# Patient Record
Sex: FEMALE | Race: AMERICAN INDIAN OR ALASKA NATIVE | URBAN - METROPOLITAN AREA
[De-identification: names, ages, dates, MRNs, and addresses within clinical notes are randomized per-mention and may not be internally consistent; named-entity substitution may affect disease eponyms.]

---

## 2023-03-28 LAB
ABO GROUP (TYPE) IN BLOOD: NORMAL
ANTIBODY SCREEN: NORMAL
CHLAMYDIA TRACH., AMPLIFIED: NEGATIVE
DEHYDROEPIANDROSTERONE SULFATE (DHEA-S) (UG/DL) IN SER/: 222 UG/DL (ref 65–395)
ESTIMATED AVERAGE GLUCOSE FOR HBA1C: 103 MG/DL
HEMOGLOBIN A1C/HEMOGLOBIN TOTAL IN BLOOD: 5.2 %
HEPATITIS B VIRUS SURFACE AG PRESENCE IN SERUM: NONREACTIVE
HEPATITIS C VIRUS AB PRESENCE IN SERUM: NONREACTIVE
HIV 1/ 2 AG/AB SCREEN: NONREACTIVE
N. GONORRHEA, AMPLIFIED: NEGATIVE
PROLACTIN (UG/L) IN SER/PLAS: 6.8 UG/L (ref 3–20)
RH FACTOR: NORMAL
RUBELLA VIRUS IGG AB: POSITIVE
SYPHILIS TOTAL AB: NONREACTIVE
THYROPEROXIDASE AB (IU/ML) IN SER/PLAS: <28 IU/ML
THYROTROPIN (MIU/L) IN SER/PLAS BY DETECTION LIMIT <= 0.05 MIU/L: 3.02 MIU/L (ref 0.44–3.98)
VARICELLA ZOSTER IGG: POSITIVE

## 2023-04-01 LAB — ANTI-MULLERIAN HORMONE (AMH): 4.6 NG/ML (ref 0.4–16.02)

## 2023-05-22 LAB — CHORIOGONADOTROPIN (MIU/ML) IN SER/PLAS: 245 MIU/ML

## 2023-05-30 LAB
CHORIOGONADOTROPIN (MIU/ML) IN SER/PLAS: 9243 MIU/ML
THYROTROPIN (MIU/L) IN SER/PLAS BY DETECTION LIMIT <= 0.05 MIU/L: 4.93 MIU/L (ref 0.44–3.98)
THYROXINE (T4) FREE (NG/DL) IN SER/PLAS: 0.75 NG/DL (ref 0.61–1.12)

## 2023-07-10 LAB
ABO GROUP (TYPE) IN BLOOD: NORMAL
ANTIBODY SCREEN: NORMAL
CHORIOGONADOTROPIN (MIU/ML) IN SER/PLAS: ABNORMAL MIU/ML
ERYTHROCYTE DISTRIBUTION WIDTH (RATIO) BY AUTOMATED COUNT: 13.6 % (ref 11.5–14.5)
ERYTHROCYTE MEAN CORPUSCULAR HEMOGLOBIN CONCENTRATION (G/DL) BY AUTOMATED: 34 G/DL (ref 32–36)
ERYTHROCYTE MEAN CORPUSCULAR VOLUME (FL) BY AUTOMATED COUNT: 77 FL (ref 80–100)
ERYTHROCYTES (10*6/UL) IN BLOOD BY AUTOMATED COUNT: 4.89 X10E12/L (ref 4–5.2)
HEMATOCRIT (%) IN BLOOD BY AUTOMATED COUNT: 37.7 % (ref 36–46)
HEMOGLOBIN (G/DL) IN BLOOD: 12.8 G/DL (ref 12–16)
LEUKOCYTES (10*3/UL) IN BLOOD BY AUTOMATED COUNT: 6.2 X10E9/L (ref 4.4–11.3)
PLATELETS (10*3/UL) IN BLOOD AUTOMATED COUNT: 252 X10E9/L (ref 150–450)
RH FACTOR: NORMAL

## 2023-07-19 LAB — CHORIOGONADOTROPIN (MIU/ML) IN SER/PLAS: 782 IU/L

## 2023-07-26 LAB — CHORIOGONADOTROPIN (MIU/ML) IN SER/PLAS: 203 IU/L

## 2023-08-02 LAB — CHORIOGONADOTROPIN (MIU/ML) IN SER/PLAS: 62 IU/L

## 2023-08-09 LAB — CHORIOGONADOTROPIN (MIU/ML) IN SER/PLAS: 22 IU/L

## 2023-08-11 LAB — THYROTROPIN (MIU/L) IN SER/PLAS BY DETECTION LIMIT <= 0.05 MIU/L: 2.53 MIU/L (ref 0.44–3.98)

## 2023-10-16 ENCOUNTER — TELEMEDICINE (OUTPATIENT)
Dept: ENDOCRINOLOGY | Facility: CLINIC | Age: 28
End: 2023-10-16
Payer: COMMERCIAL

## 2023-10-16 DIAGNOSIS — O03.9 MISCARRIAGE (HHS-HCC): ICD-10-CM

## 2023-10-16 DIAGNOSIS — N96 RECURRENT PREGNANCY LOSS WITHOUT CURRENT PREGNANCY: Primary | ICD-10-CM

## 2023-10-16 PROCEDURE — 99215 OFFICE O/P EST HI 40 MIN: CPT | Performed by: OBSTETRICS & GYNECOLOGY

## 2023-10-16 ASSESSMENT — PAIN SCALES - GENERAL: PAINLEVEL: 0-NO PAIN

## 2023-10-16 NOTE — Clinical Note
@Shani- she doesn't need anything for this month but wants to ensure her BP is complete for an IUI month next month. Lmk if questions! Maria MENDOSA

## 2023-10-16 NOTE — PROGRESS NOTES
History of Present Illness: INTERVAL HISTORY:    [place] for past few months. Was reviewing labs from August and was wanting to know where to  based on last labs. Not taking Metformin. Stopped taking Synthroid. Was taking Clomid 150mg this month, last pill was this . Today is cycle day 8. For the past two months, periods have been regular. Did not do a urine pregnancy test.   BMI = 28    History of Present IllnessINT Hx: Here for new plan of care after D&C for aneuploid pregnancy loss.  (SAB x 2)  Anora 46XX  Taking synthroid 50mcg for elevated TSH and has not had a repeat TSH done. Has taken letrozole 5mg and progesterone in the past.   She is positive for alpha thal but he is not.   + hx oligomenorrhea, ovaries appear somewhat PCOS on US - AMH = 4.6  SA was WNL  IUI the month of conception with clomid 150mg and OPK showed TMC of 3.9, she did use BID progesterone     Prior HPI:  History of Present Illness  Referred by: Dr. Watson     Accompanied today by:      DATE OF COVID VACCINE: Pfizer x2     History of present illness: Patient is a 27 year year old - passed on own in 2021- early SAB (potentially another false positive on a kit in [place]) female who presents with trying to conceive x 2 years.      PRIOR EVALUATION / TREATMENT: letrozole x 6 months ? (2.5mg and 5mg)     AMH:   TSH: 3.19 ([facility])  PRL:   Blood Type:  pap normal   DHEAS:  HSG  normal  Rubella immune  [facility]  Varicella immune  [facility]  FSH: 8.8 ([facility] 10/2022)  Testosterone 24 10/22 [facility]  Other: SA from - Vol 3.1, Conc: 44 mil, Mot 50%,  mil, TMS 68 mil     Pelvic ult at [facility] in 10/222 normal     RELATIONSHIP STATUS:      OB Hx: (year, GA, mode, complications, name)  1 early SAB she passed on her own in .     GYN HISTORY:  History of STI or PID: no  Last pap smear: - at [facility] will send us the records  History of abnormal paps: no  Mammogram: no  Coitus: x/fertile week: 5-6 x    Pelvic pain: no  Pain with intercourse, bowel movements or full bladder: no     MENSTRUAL HISTORY:   LMP: 3/21/23  Menarche: 10  Contraception: no  Cycle length: q 30-34 days  Bleeding length: 3-5 days  Heavy bleeding 1-3 heavier and then very light  Dysmenorrhea: cramping x 1 day  OPK is positive around cd 18-21     ENDOCRINE/INFERTILITY HISTORY:  Duration of infertility: 2 years  Coital Activity/week: 5-6 x  Nipple Discharge: no  Vision changes / headaches: is prone to headaches  Excess hair growth: no  Acne/oily skin: no  Recent weight change: no  Exercise: yes, 5 x a week  PMH: none     MEDICATION: PNV, vitamin D     PSH: skin tag removed of rectum     PSYCH HISTORY: No     SOCIAL HISTORY:  Occupation:   Smoking: no  Alcohol: no  Drug use: no        PARTNER HISTORY:  PARTNER HISTORY: Pooja Pelon Plummer  Age-  89  Occupation-   Prior fertility history: no  PMH: no  PSH: skin tag removal   Smoking: occasional  Alcohol Use: no  Drug Use: no  Medications: no  Injuries /STDs: no  SA: yes at [facility]     FAMILY HISTORY:  Cancer history (breast, ovarian, colon): No     GENETIC HISTORY:   Ethnic Background:   Patient:  Spanish  Partner:  Spanish  Genetic Disease in Family: no  Birth Defects in Family: no  Genetic screening performed previously: no     28 yo  (SAB x 2), recent loss confirmed euploid with Anora 46XX  ? mild male factor  ? poss mild PCOS  HCG nearly resolved after SAB     PLAN  Orders Placed This Encounter   Procedures    Thyroid Peroxidase (TPO) Antibody     Standing Status:   Future     Standing Expiration Date:   10/16/2024     Order Specific Question:   Release result to [employer]     Answer:   Immediate [1]    Cardiolipin Antibody     Standing Status:   Future     Standing Expiration Date:   10/16/2024     Order Specific Question:   Release result to [employer]     Answer:   Immediate [1]    Beta-2 Glycoprotein Antibodies     Standing Status:   Future      Standing Expiration Date:   10/16/2024     Order Specific Question:   Release result to MyChart     Answer:   Immediate [1]    Lupus Anticoag. With Interpretation[Baker]     Standing Status:   Future     Standing Expiration Date:   10/16/2024     Order Specific Question:   Release result to MyChart     Answer:   Immediate [1]    Prolactin     Standing Status:   Future     Standing Expiration Date:   10/16/2024     Order Specific Question:   Release result to MyChart     Answer:   Immediate [1]    hCG, quantitative, pregnancy     Standing Status:   Future     Standing Expiration Date:   10/16/2024     Order Specific Question:   Release result to MyChart     Answer:   Immediate [1]         - Needs HCG to confirm that she is completely down to zero- will add to RPL labs  - Restart synthroid 50mcg - TSH check in 6 weeks, add anti TPO AB  - Reviewed typical causes of RPL - hormonal, genetic, structural, and clotting. We will add RPL labs today. Reviewed poss mild PCOS given apperance of ovaries on US and oligomenorrhea and AMH = 4.6.   - Metformin sent in so that she can start in [place] if she wishes. We will start with 500mg ER dose for now but advised to increase to 1500mg daily.   - Discussed clomid 150mg CD 3-7 with cycle monitoring trigger and IUI and plan progesterone in LP + metformin with the start of her NEXT menses.   - Reviewed some lifestyle changes in light of poss PCOS - reduce carbs and increase daily  - To start coated aspirin 81mg daily for empiric RPL prevention  - Chart to RN for follow up    Maria García MD

## 2023-10-17 ENCOUNTER — TELEPHONE (OUTPATIENT)
Dept: ENDOCRINOLOGY | Facility: CLINIC | Age: 28
End: 2023-10-17
Payer: COMMERCIAL

## 2023-10-17 NOTE — TELEPHONE ENCOUNTER
Calling about patient lab services     Phone call to patient. Let her know that orders are in and she can go to have it drawn at any time. She states she will go to [place] [facility].   Sarah Lagunas RN   10/17/23   3:18 PM

## 2023-10-18 ENCOUNTER — LAB (OUTPATIENT)
Dept: LAB | Facility: LAB | Age: 28
End: 2023-10-18
Payer: COMMERCIAL

## 2023-10-18 DIAGNOSIS — O03.9 MISCARRIAGE (HHS-HCC): ICD-10-CM

## 2023-10-18 DIAGNOSIS — N96 RECURRENT PREGNANCY LOSS WITHOUT CURRENT PREGNANCY: ICD-10-CM

## 2023-10-18 LAB
B-HCG SERPL-ACNC: <3 MIU/ML
B2 GLYCOPROT1 IGA SER-ACNC: 0.7 U/ML
B2 GLYCOPROT1 IGG SER-ACNC: <1.4 U/ML
B2 GLYCOPROT1 IGM SER-ACNC: 1.6 U/ML
CARDIOLIPIN IGA SERPL-ACNC: 0.7 APL U/ML
CARDIOLIPIN IGG SER IA-ACNC: <1.6 GPL U/ML
CARDIOLIPIN IGM SER IA-ACNC: 1.3 MPL U/ML
PROLACTIN SERPL-MCNC: 5 UG/L (ref 3–20)
THYROPEROXIDASE AB SERPL-ACNC: <28 IU/ML

## 2023-10-18 PROCEDURE — 85613 RUSSELL VIPER VENOM DILUTED: CPT

## 2023-10-18 PROCEDURE — 84702 CHORIONIC GONADOTROPIN TEST: CPT

## 2023-10-18 PROCEDURE — 84146 ASSAY OF PROLACTIN: CPT

## 2023-10-18 PROCEDURE — 85730 THROMBOPLASTIN TIME PARTIAL: CPT

## 2023-10-18 PROCEDURE — 86147 CARDIOLIPIN ANTIBODY EA IG: CPT

## 2023-10-18 PROCEDURE — 86376 MICROSOMAL ANTIBODY EACH: CPT

## 2023-10-18 PROCEDURE — 36415 COLL VENOUS BLD VENIPUNCTURE: CPT

## 2023-10-18 PROCEDURE — 86146 BETA-2 GLYCOPROTEIN ANTIBODY: CPT

## 2023-10-19 LAB
DRVVT SCREEN TO CONFIRM RATIO: 1.31 RATIO
DRVVT/DRVVT CFM NRMLZD PPP-RTO: 1.1 RATIO
DRVVT/DRVVT CFM P DOAC NEUT NORM PPP-RTO: 1.2 RATIO
LA 2 SCREEN W REFLEX-IMP: NORMAL
NORMALIZED SCT PPP-RTO: 0.89 RATIO
SILICA CLOTTING TIME CONFIRMATION: 1.37 RATIO
SILICA CLOTTING TIME SCREEN: 1.22 RATIO

## 2023-11-06 DIAGNOSIS — N97.9 FEMALE INFERTILITY: Primary | ICD-10-CM

## 2023-11-06 NOTE — PROGRESS NOTES
Boarding Pass Oral/Injectible with TIC/IUI Checklist    Age: 28 y.o.    Provider: Rowan De La Fuente CNP, R Flyckt MD  Primary RN: Doc Rosa  Reasons for Treatment: Unexplained Infertility and Recurrent Pregnancy Loss  Last BMI  24 : 29.86 kg/m²       Past Medical History:   Diagnosis Date    History of miscarriage      Ectopic Risk: N    Date Done Consultation Results/Comments   10/16/23          4/18/24 Medication Protocol Stimulation protocol: Clomid 150mg CD3-7  Trigger plan: HCG  Adjuncts:  ASA 81 mg      clomid 150mg CD 3-7 with cycle monitoring trigger and IUI and plan progesterone in LP + metformin 500mg every day (ok to not increase d/t side effects) with the start of her NEXT menses.   - To start coated aspirin 81mg daily for empiric RPL prevention. Start prednisone 10mg with pos preg test per RF.     If they decide to do SO will do letrozole 7.5mg x 5 with starting menopur 75 Ius daily until time of trigger. 24 patient decided to do SO - see BP cleared note at bottom             N/a MFM Consult Okay to proceed? N/A   N/a Psych Consult Okay to proceed? N/A   N/a Genetics Consult Okay to proceed? N/A    Other    Date Done Female Labs Results/Comments   7/10/2023 T&S (Q 1 Year) ABO: O  Rh: POS  Antibody: NEG   2024 Hep B sAg Nonreactive   2024 Hep C AB Nonreactive   2024 HIV Nonreactive   2024 Syphilis Nonreactive   2024 GC/CT GC: Negative  CT: Negative   3/28/2023 Rubella (Q 5 Years) POSITIVE     3/28/2023 Varicella (Q 5 Years) POSITIVE     2023 TSH 2.53 (Ref range: 0.44 - 3.98 mIU/L)   3/8/23 HgbA1C 5.2   3/8/23 AMH 4.598   3/28/23 Carrier Screening Myriad 2bP:   POS ALONAMALASEMMIA     3/28/23 Uterine Cavity Eval HSG:   Normal     SIS:    Hyster:    21 Pap Smear (Q 5 Years) Neg ([facility] result)  HPV:    N/a Mammogram ( > 40) (Q 1 Year)       Date Done Male Labs (required if IUI)  MEGAN TURNER (MRN: [id number]) Results/Comments   2024 Hep B sAg  Nonreactive   4/18/2024 Hep C AB  Nonreactive   4/18/2024 HIV Nonreactive   4/18/2024 Syphilis Nonreactive   4/18/2024 GC/CT GC: Negative  CT: Negative   3/28/23 Carrier Screening POS KRABBE, CANAVAN        5/8/23 Semen Analysis  Volume(mL): 3.1  Count(million): 103.3  Motility(%): 36  Motile Count(million): 37.46   Date Done Miscellaneous Results/Comments   N/A BMI Checklist  BMI > 40 or < 18 Added to chart:      N/A >= 45 Checklist  Added to chart:      Boarding Pass reviewed with LUCERO Laugnas RN and is complete and patient cleared to proceed with fertility treatment: Clomid 150 mg days 3-7/IUI with monitoring + Ovidrel Trigger and LP Prometrium 200 mg BID PV to start 3 days after IUI x 3 cycles and if no success evaluation needed.     Boarding pass reviewed with: Shani AMBROSIO  Protocol: Letrozole 7.5mg x 5 days with HMG 75 units subcutaneous daily until time of trigger x 3 cycles.  Plan on baby aspirin 81mg po daily.  Confirm with Dr. García about prednisone 5-10mg with positive pregnancy test.  Sperm: sperm  Testing up to date. yes  Procedure order placed. yes    Boarding pass approved for 3 cycles/until 8/24.    Rowan De La Fuente 05/13/24 10:08 AM

## 2023-11-10 ENCOUNTER — TELEPHONE (OUTPATIENT)
Dept: ENDOCRINOLOGY | Facility: CLINIC | Age: 28
End: 2023-11-10

## 2023-11-10 DIAGNOSIS — N97.9 FEMALE INFERTILITY: ICD-10-CM

## 2023-11-10 DIAGNOSIS — Z31.89 ENCOUNTER FOR ARTIFICIAL INSEMINATION: ICD-10-CM

## 2023-11-10 RX ORDER — CLOMIPHENE CITRATE 50 MG/1
150 TABLET ORAL DAILY
Qty: 15 TABLET | Refills: 0 | Status: SHIPPED | OUTPATIENT
Start: 2023-11-10 | End: 2023-12-11 | Stop reason: SDUPTHER

## 2023-11-10 RX ORDER — PROGESTERONE 200 MG/1
200 CAPSULE ORAL DAILY
Qty: 60 CAPSULE | Refills: 2 | Status: SHIPPED | OUTPATIENT
Start: 2023-11-10 | End: 2024-02-08

## 2023-11-10 NOTE — TELEPHONE ENCOUNTER
Patient is on her 3rd day of cycle and has to start her Clomid  Please send an Rx   She also wants to know the dosage she has to take   Please call her once Rx sent

## 2023-11-13 ENCOUNTER — SPECIALTY PHARMACY (OUTPATIENT)
Dept: PHARMACY | Facility: CLINIC | Age: 28
End: 2023-11-13

## 2023-11-13 ENCOUNTER — PHARMACY VISIT (OUTPATIENT)
Dept: PHARMACY | Facility: CLINIC | Age: 28
End: 2023-11-13
Payer: COMMERCIAL

## 2023-11-13 PROCEDURE — RXMED WILLOW AMBULATORY MEDICATION CHARGE

## 2023-11-14 ENCOUNTER — TELEPHONE (OUTPATIENT)
Dept: ENDOCRINOLOGY | Facility: CLINIC | Age: 28
End: 2023-11-14
Payer: COMMERCIAL

## 2023-11-14 DIAGNOSIS — N97.9 FEMALE INFERTILITY: ICD-10-CM

## 2023-11-14 NOTE — TELEPHONE ENCOUNTER
Patient is calling to get scheduled for her monitoring on Friday 11/17/23 follicle scan e2 lh  She stated she spoke with Aisha JARRELL  Please confirm no orders

## 2023-11-14 NOTE — TELEPHONE ENCOUNTER
Returned call to patient. Pended orders, orders signed for monitoring visit on 11/17. Relayed to patient she is good to schedule appointment, transferred patient to  to be scheduled for monitoring visit.   ULISES SANABRIA RN 11/14/2023 14:17

## 2023-11-17 ENCOUNTER — SPECIALTY PHARMACY (OUTPATIENT)
Dept: PHARMACY | Facility: CLINIC | Age: 28
End: 2023-11-17

## 2023-11-17 ENCOUNTER — ANCILLARY PROCEDURE (OUTPATIENT)
Dept: ENDOCRINOLOGY | Facility: CLINIC | Age: 28
End: 2023-11-17
Payer: COMMERCIAL

## 2023-11-17 DIAGNOSIS — N97.9 FEMALE INFERTILITY: ICD-10-CM

## 2023-11-17 DIAGNOSIS — Z31.89 ENCOUNTER FOR ARTIFICIAL INSEMINATION: ICD-10-CM

## 2023-11-17 LAB
ESTRADIOL SERPL-MCNC: 253 PG/ML
LH SERPL-ACNC: 8 IU/L

## 2023-11-17 PROCEDURE — 83002 ASSAY OF GONADOTROPIN (LH): CPT

## 2023-11-17 PROCEDURE — 82670 ASSAY OF TOTAL ESTRADIOL: CPT

## 2023-11-17 PROCEDURE — 36415 COLL VENOUS BLD VENIPUNCTURE: CPT

## 2023-11-17 PROCEDURE — 76857 US EXAM PELVIC LIMITED: CPT

## 2023-11-17 PROCEDURE — 76857 US EXAM PELVIC LIMITED: CPT | Performed by: OBSTETRICS & GYNECOLOGY

## 2023-11-17 RX ORDER — METFORMIN HYDROCHLORIDE 750 MG/1
750 TABLET, EXTENDED RELEASE ORAL
Qty: 30 TABLET | Refills: 1 | Status: SHIPPED | OUTPATIENT
Start: 2023-11-17 | End: 2024-11-16

## 2023-11-17 NOTE — PROGRESS NOTES
CYCLING NOTE    Here for US and/or lab monitoring for IUI #2; relevant findings reviewed.  Protocol is Clomid 150 with monitoring, trigger and IUI and LP prometrium.    Patient stayed for nurse visit. Pain is 0/10.  Patient and  provided education on how to inject Ovidrel trigger shot and given written instructions as well as link to video.   Patient states she has restarted her Synthroid and has been taking Metformin 750mg at dinner each evening. States she is not having any difficult with that dose. Advised patient to increase to 1500mg each evening at dinner as Dr. García wanted her to get up to 1500mg if possible. She verbalizes understanding. Refill placed for her metformin to her local pharmacy.  Team will contact patient later today with results and plan.    Doc Rosa  11/17/2023  8:35 AM    HUDDLE PROVIDER NOTE  Ultrasound and/or labs reviewed at huddle.   Results for orders placed or performed in visit on 11/17/23 (from the past 96 hour(s))   Estradiol   Result Value Ref Range    Estradiol 253 pg/mL   Luteinizing Hormone (LH)   Result Value Ref Range    Luteinizing Hormone 8.0 IU/L       RTC in four days for Follicle scan and Estradiol, Progesterone, and LH.   Natalie Foster    My Chart message sent to patient with above noted plan.  DOC ROSA on 11/17/23 at 2:58 PM.

## 2023-11-20 ENCOUNTER — SPECIALTY PHARMACY (OUTPATIENT)
Dept: PHARMACY | Facility: CLINIC | Age: 28
End: 2023-11-20

## 2023-11-21 ENCOUNTER — APPOINTMENT (OUTPATIENT)
Dept: ENDOCRINOLOGY | Facility: CLINIC | Age: 28
End: 2023-11-21

## 2023-11-21 ENCOUNTER — SPECIALTY PHARMACY (OUTPATIENT)
Dept: PHARMACY | Facility: CLINIC | Age: 28
End: 2023-11-21

## 2023-11-21 NOTE — PROGRESS NOTES
Patient called in regarding Ovidrel- had a question on storage. Advised it is to be stored under refrigeration until time to trigger.     Patient voiced understanding.      Saira Shaw (Katie), PharmD  [facility]  Clinical Pharmacy Specialist- Fertility   [facility], Nandini Collado  [address] [place] [address]  Email: [email or web address]  Tel: [phone]       Fax: [phone]

## 2023-11-22 ENCOUNTER — ANCILLARY PROCEDURE (OUTPATIENT)
Dept: ENDOCRINOLOGY | Facility: CLINIC | Age: 28
End: 2023-11-22
Payer: COMMERCIAL

## 2023-11-22 DIAGNOSIS — Z31.89 ENCOUNTER FOR ARTIFICIAL INSEMINATION: ICD-10-CM

## 2023-11-22 DIAGNOSIS — N97.9 FEMALE INFERTILITY: ICD-10-CM

## 2023-11-22 LAB
ESTRADIOL SERPL-MCNC: 590 PG/ML
LH SERPL-ACNC: 9 IU/L
PROGEST SERPL-MCNC: 0.9 NG/ML

## 2023-11-22 PROCEDURE — 36415 COLL VENOUS BLD VENIPUNCTURE: CPT

## 2023-11-22 PROCEDURE — 82670 ASSAY OF TOTAL ESTRADIOL: CPT

## 2023-11-22 PROCEDURE — 83002 ASSAY OF GONADOTROPIN (LH): CPT

## 2023-11-22 PROCEDURE — 84144 ASSAY OF PROGESTERONE: CPT

## 2023-11-22 PROCEDURE — 76857 US EXAM PELVIC LIMITED: CPT

## 2023-11-22 NOTE — PROGRESS NOTES
CYCLING NOTE    Here for US and/or lab monitoring for IUI #2; relevant findings reviewed.  Reviewed proper administration technique of Ovidrel trigger with patient today.   Patient stayed for nurse visit. Pain is 0/10  Team will contact patient later today with results and plan.    Doc Rosa  11/22/2023  8:00 AM    HUDDLE PROVIDER NOTE  Ultrasound and/or labs reviewed at huddle.   Results for orders placed or performed in visit on 11/22/23 (from the past 96 hour(s))   Estradiol   Result Value Ref Range    Estradiol 590 pg/mL   Luteinizing Hormone (LH)   Result Value Ref Range    Luteinizing Hormone 9.0 IU/L   Progesterone   Result Value Ref Range    Progesterone 0.9 ng/mL     u 11/23 (Day 16)   choriogonadotropin josi injection: Inject 250 mcg 1 time if needed under the skin    RTC in three days for  IUI  and  no labs .   Natalie Foster    My Chart message sent to patient with above noted plan.  DOC ROSA on 11/22/23 at 2:37 PM.

## 2023-11-24 ENCOUNTER — APPOINTMENT (OUTPATIENT)
Dept: ENDOCRINOLOGY | Facility: CLINIC | Age: 28
End: 2023-11-24

## 2023-11-25 ENCOUNTER — PROCEDURE VISIT (OUTPATIENT)
Dept: ENDOCRINOLOGY | Facility: CLINIC | Age: 28
End: 2023-11-25
Payer: COMMERCIAL

## 2023-11-25 DIAGNOSIS — Z31.89 ENCOUNTER FOR ARTIFICIAL INSEMINATION: ICD-10-CM

## 2023-11-25 PROCEDURE — 58322 ARTIFICIAL INSEMINATION: CPT | Performed by: OBSTETRICS & GYNECOLOGY

## 2023-11-25 PROCEDURE — 89261 SPERM ISOLATION COMPLEX: CPT | Performed by: STUDENT IN AN ORGANIZED HEALTH CARE EDUCATION/TRAINING PROGRAM

## 2023-11-25 NOTE — PROGRESS NOTES
"Patient ID: Delia Hernández is a 28 y.o. female.    Intrauterine Insemination (IUI)    Date/Time: 11/25/2023 10:43 AM    Performed by: Natali Watson MD  Authorized by: MATA JaneCNP    Consent:     Consent obtained:  Verbal and written    Consent given by:  Patient    Procedure risks and benefits discussed: yes      Patient questions answered: yes      Patient agrees, verbalizes understanding, and wants to proceed: yes      Educational handouts given: yes      Instructions and paperwork completed: yes    Procedure:     Pelvic exam performed: yes      Speculum placed in vagina: yes      Tenaculum applied to cervix: no      Type of insemination: intrauterine insemination      Ultrasound guidance: No      Speculum type: Soha      Catheter type: curved      Curvature: mild      Difficulty: easy      Estimated Blood Loss:  None    Specimen Return: none    Post-procedure:     Patient tolerated procedure well: yes      Post-procedure plan: patient counseled on signs and symptoms for which to call and/or return to clinic    Comments:     Procedure comments:  IUI Procedure note:    The patient presented today for IUI.     Consent signed by patient, IUI sample identified by patient, and Final Verification performed with patient via electronic system \"\" prior to insemination.   All patient's questions were discussed and answered.          Chaperone offered to patient for intimate exam: Patient declined chaperone  Name of chaperone: N/A    Specimen Source: Partner  Specimen Condition: Fresh  TMS 6.62mil    Additional notes: uncomplicated IUI with curved IUI catheter    Patient was advised to call office if she develops fever, chills, pelvic pain, or heavy bleeding.    Progesterone and post-IUI teaching completed. Pt will do a home pregnancy test two weeks from IUI date. If home pregnancy test positive, [facility]. If home pregnancy test negative, patient was advised to call office " with start of menses; will proceed with another cycle if appropriate.  Patient verbalized understanding of plan.    Attending Attestation: I was physically present for key and critical portions performed by the fellow. I reviewed the fellow's documentation and discussed the patient with the fellow. I agree with the fellow's medical decision making as documented in the fellow's note.             James Wu MD  Reproductive Endocrinology and Infertility  [facility]

## 2023-12-11 ENCOUNTER — DOCUMENTATION (OUTPATIENT)
Dept: ENDOCRINOLOGY | Facility: CLINIC | Age: 28
End: 2023-12-11

## 2023-12-11 ENCOUNTER — TELEPHONE (OUTPATIENT)
Dept: ENDOCRINOLOGY | Facility: CLINIC | Age: 28
End: 2023-12-11
Payer: COMMERCIAL

## 2023-12-11 DIAGNOSIS — N97.9 FEMALE INFERTILITY: ICD-10-CM

## 2023-12-11 RX ORDER — CLOMIPHENE CITRATE 50 MG/1
150 TABLET ORAL DAILY
Qty: 15 TABLET | Refills: 0 | Status: SHIPPED | OUTPATIENT
Start: 2023-12-11 | End: 2024-01-09 | Stop reason: WASHOUT

## 2023-12-11 NOTE — TELEPHONE ENCOUNTER
Patient would like a call back - wanted to make sure her Clomid was ordered as she has not heard back from the pharmacy

## 2023-12-11 NOTE — TELEPHONE ENCOUNTER
Returned patient's call, advised her that the pharmacy will not likely call her and she will need to call them to see if the medication is ready to be picked up or go there directly. Did let patient know medication was signed off this morning.   Patient states she started her Clomid yesterday as she had some. Advised patient that she will take CD 3-7 since she already started the medication, not CD4-8 as sent in her My Chart message this morning. She verbalizes understanding. Will return to office on 12/21 for CD14 monitoring appointment.  GIO Rosa RN 12/11/23 @ 1943

## 2023-12-21 ENCOUNTER — PHARMACY VISIT (OUTPATIENT)
Dept: PHARMACY | Facility: CLINIC | Age: 28
End: 2023-12-21
Payer: COMMERCIAL

## 2023-12-21 ENCOUNTER — SPECIALTY PHARMACY (OUTPATIENT)
Dept: PHARMACY | Facility: CLINIC | Age: 28
End: 2023-12-21

## 2023-12-21 ENCOUNTER — ANCILLARY PROCEDURE (OUTPATIENT)
Dept: ENDOCRINOLOGY | Facility: CLINIC | Age: 28
End: 2023-12-21
Payer: COMMERCIAL

## 2023-12-21 DIAGNOSIS — Z31.89 ENCOUNTER FOR ARTIFICIAL INSEMINATION: ICD-10-CM

## 2023-12-21 DIAGNOSIS — N97.9 FEMALE INFERTILITY: ICD-10-CM

## 2023-12-21 LAB
ESTRADIOL SERPL-MCNC: 261 PG/ML
LH SERPL-ACNC: 6.4 IU/L
PROGEST SERPL-MCNC: 1 NG/ML

## 2023-12-21 PROCEDURE — 83002 ASSAY OF GONADOTROPIN (LH): CPT

## 2023-12-21 PROCEDURE — 82670 ASSAY OF TOTAL ESTRADIOL: CPT

## 2023-12-21 PROCEDURE — 84144 ASSAY OF PROGESTERONE: CPT

## 2023-12-21 PROCEDURE — 36415 COLL VENOUS BLD VENIPUNCTURE: CPT

## 2023-12-21 PROCEDURE — RXMED WILLOW AMBULATORY MEDICATION CHARGE

## 2023-12-21 PROCEDURE — 76857 US EXAM PELVIC LIMITED: CPT

## 2023-12-21 NOTE — PROGRESS NOTES
CYCLING NOTE    Here for US and/or lab monitoring; relevant findings reviewed. She understands she will need Ovidrel potentially today or tomorrow from [facility]    Patient stayed for nurse visit. Pain is 0/10  Team will contact patient later today with results and plan.    Tangela Dockery  12/21/2023  9:18 AM    HUDDLE PROVIDER NOTE  [facility]   Results for orders placed or performed in visit on 12/21/23 (from the past 96 hour(s))   Estradiol   Result Value Ref Range    Estradiol 261 pg/mL   Luteinizing Hormone (LH)   Result Value Ref Range    Luteinizing Hormone 6.4 IU/L   Progesterone   Result Value Ref Range    Progesterone 1.0 ng/mL     Thu 12/21 (Day 14)   choriogonadotropin josi injection: Inject 250 mcg 1 time if needed under the skin    RTC in two days for  IUI  and  NO LABS .   Maria García  12/21/2023  12:51 PM    \calledpt and left vm: call [facility] now to get ovidrel delivered today. Trigger tonight between 8pm-10pm and call office today or tomorrow to get scheduled for IUI on 12/23. Call with any questions or concerns. My Chart message sent to patient also.   TANGELA DOCKERY on 12/21/23 at 1:56 PM.     Pt returned my call so My Chart message never sent. We discussed plan. Contact info given for [facility]. She will call them now. T/F to front to make IUI appt for 12/23. Pt verbalized understanding and is agreeable.   TANGELA DOCKERY on 12/21/23 at 2:00 PM.

## 2023-12-23 ENCOUNTER — PROCEDURE VISIT (OUTPATIENT)
Dept: ENDOCRINOLOGY | Facility: CLINIC | Age: 28
End: 2023-12-23
Payer: COMMERCIAL

## 2023-12-23 DIAGNOSIS — Z31.89 ENCOUNTER FOR ARTIFICIAL INSEMINATION: ICD-10-CM

## 2023-12-23 PROCEDURE — 89261 SPERM ISOLATION COMPLEX: CPT | Performed by: OBSTETRICS & GYNECOLOGY

## 2023-12-23 PROCEDURE — 58322 ARTIFICIAL INSEMINATION: CPT | Mod: GC | Performed by: OBSTETRICS & GYNECOLOGY

## 2023-12-23 NOTE — PROGRESS NOTES
"Patient ID: Delia Hernández is a 28 y.o. female.    Intrauterine Insemination (IUI)    Date/Time: 12/23/2023 10:28 AM    Performed by: Sarah Quintero MD  Authorized by: SHEELA Jane    Consent:     Consent obtained:  Verbal and written    Consent given by:  Patient    Procedure risks and benefits discussed: yes      Patient questions answered: yes      Patient agrees, verbalizes understanding, and wants to proceed: yes      Educational handouts given: yes      Instructions and paperwork completed: yes    Procedure:     Pelvic exam performed: yes      Speculum placed in vagina: yes      Tenaculum applied to cervix: no      Type of insemination: intrauterine insemination      Ultrasound guidance: No      Speculum type: Soha      Catheter type: curved      Curvature: mild      Difficulty: easy      Estimated Blood Loss:  None    Specimen Return: none    Post-procedure:     Patient tolerated procedure well: yes      Post-procedure plan: patient counseled on signs and symptoms for which to call and/or return to clinic    Comments:     Procedure comments:  IUI Procedure note:    The patient presented today for IUI.     Consent signed by patient, IUI sample identified by patient, and Final Verification performed with patient via electronic system \"\" prior to insemination.   All patient's questions were discussed and answered.          Chaperone offered to patient for intimate exam: Patient declined chaperone  Name of chaperone: N/A    Specimen Source: Partner  Specimen Condition: Fresh  TMS 30.7    Additional notes:    Patient was advised to call office if she develops fever, chills, pelvic pain, or heavy bleeding.    Progesterone and post-IUI teaching completed. Will start Progesterone three days after IUI and continue twice daily. Pt will do a home pregnancy test two weeks from IUI date. If home pregnancy test positive, patient will continue Progesterone and call office to schedule BHCG. If home " pregnancy test negative, patient will discontinue Progesterone, and was advised to call office with start of menses; will proceed with another cycle if appropriate.  Patient verbalized understanding of plan.    Attending Attestation: I was physically present for key and critical portions performed by the fellow. I reviewed the fellow's documentation and discussed the patient with the fellow. I agree with the fellow's medical decision making as documented in the fellow's note.

## 2024-01-09 ENCOUNTER — TELEPHONE (OUTPATIENT)
Dept: ENDOCRINOLOGY | Facility: CLINIC | Age: 29
End: 2024-01-09
Payer: COMMERCIAL

## 2024-01-09 DIAGNOSIS — N97.9 FEMALE INFERTILITY: ICD-10-CM

## 2024-01-09 DIAGNOSIS — E03.8 SUBCLINICAL HYPOTHYROIDISM: Primary | ICD-10-CM

## 2024-01-09 DIAGNOSIS — Z31.89 ENCOUNTER FOR ARTIFICIAL INSEMINATION: ICD-10-CM

## 2024-01-09 RX ORDER — LEVOTHYROXINE SODIUM 25 UG/1
50 TABLET ORAL DAILY
Qty: 60 TABLET | Refills: 11 | Status: SHIPPED
Start: 2024-01-09 | End: 2024-04-18

## 2024-01-09 RX ORDER — METFORMIN HYDROCHLORIDE 500 MG/1
1000 TABLET, EXTENDED RELEASE ORAL
Qty: 60 TABLET | Refills: 0 | Status: SHIPPED | OUTPATIENT
Start: 2024-01-09 | End: 2024-02-08

## 2024-01-09 RX ORDER — CLOMIPHENE CITRATE 50 MG/1
TABLET ORAL
Qty: 10 TABLET | Refills: 0 | Status: SHIPPED
Start: 2024-01-09 | End: 2024-01-11 | Stop reason: WASHOUT

## 2024-01-09 NOTE — TELEPHONE ENCOUNTER
Returned patient's call.  Patient called with menses for IUI cycle  Cycle #:  4  Medication: Clomid 150mg  Ovulation: Trigger  Sperm Source:  partner fresh  Luteal Support: Prometrium  Additional Medications: Aspirin  Boarding Pass signed off: 11/10/23 for 3 cycles  IUI order pended: yes  Additional Information: placed order for repeat TSH level as well as refills for Synthroid 50mcg and Metformin XR 500mg per patient request.     Patient aware to call for monitoring appointment on CD 14 which is 1/22/24. Advised to take Clomid 150mg beginning on CD3-7. Verbalizes understanding.  MADELYN LORA on 1/9/24 at 10:46 AM.

## 2024-01-09 NOTE — TELEPHONE ENCOUNTER
Patient started cycle yesterday spotting was advised to call for next steps  She is on progesterone and not sure if that makes her cycle less full flow  She wants to talk to Shani   She also needs clomid refill

## 2024-01-10 ENCOUNTER — TELEPHONE (OUTPATIENT)
Dept: ENDOCRINOLOGY | Facility: CLINIC | Age: 29
End: 2024-01-10
Payer: COMMERCIAL

## 2024-01-10 NOTE — TELEPHONE ENCOUNTER
Patient is calling to talk to Shani  to change her Rx from Clomid to Letrozole this cycle  She has to start it tomorrow

## 2024-01-10 NOTE — TELEPHONE ENCOUNTER
Returned patient's call. Is requesting to take Letrozole this cycle, not Clomid, because she thinks that would be better. Explained to patient the plan per the NP is for 3 cycles of Clomid and that they review lots of things to determine the best treatment plan. Patient states she understands but requesting RN to find out if Dr. García would prefer the change.  Discussed with Dr. García who states she agrees with current treatment plan.  Called patient back to update her about Dr. García's agreement with the Clomid. Patient advised that Dr. García will review everything in detail at her upcoming FUV and discuss next steps and any changes to her treatment at that time. Patient verbalizes understanding.  MADELYN LORA on 1/10/24 at 11:56 AM.

## 2024-01-11 ENCOUNTER — TELEPHONE (OUTPATIENT)
Dept: ENDOCRINOLOGY | Facility: CLINIC | Age: 29
End: 2024-01-11
Payer: COMMERCIAL

## 2024-01-11 DIAGNOSIS — N97.9 FEMALE INFERTILITY: ICD-10-CM

## 2024-01-11 RX ORDER — CLOMIPHENE CITRATE 50 MG/1
150 TABLET ORAL DAILY
Qty: 15 TABLET | Refills: 0 | Status: SHIPPED | OUTPATIENT
Start: 2024-01-11

## 2024-01-11 NOTE — TELEPHONE ENCOUNTER
Reason for call: Medication request  Medication requested: Clomid  Notes: Patient said the incorrect amount of the medication was sent in to the pharmacy. She said it was sent to [facility] but needs to go to [facility] on [address] in [place].

## 2024-01-11 NOTE — TELEPHONE ENCOUNTER
Call returned. See documentation and orders from previous encounter.   ULISES SANABRIA, RN 01/11/2024 09:38

## 2024-01-11 NOTE — PROGRESS NOTES
Returned call to patient. Patient had wrong dose sent to wrong pharmacy for her clomid IUI cycle. Relayed to patient I pended correct dose to correct pharmacy for her to start for her IUI. No further questions at this time.   ULISES SANABRIA RN 01/11/2024 09:37

## 2024-01-15 ENCOUNTER — TELEPHONE (OUTPATIENT)
Dept: ENDOCRINOLOGY | Facility: CLINIC | Age: 29
End: 2024-01-15
Payer: COMMERCIAL

## 2024-01-15 NOTE — TELEPHONE ENCOUNTER
Returned patient's call, she is inquiring about when to schedule her monitoring appointment for this cycle's IUI. Advised patient to schedule on CD14 [facility] is 1/22/24. Patient will call office this week to schedule.  MADELYN LORA on 1/15/24 at 4:08 PM.

## 2024-01-22 ENCOUNTER — APPOINTMENT (OUTPATIENT)
Dept: ENDOCRINOLOGY | Facility: CLINIC | Age: 29
End: 2024-01-22
Payer: COMMERCIAL

## 2024-02-22 ENCOUNTER — TELEMEDICINE (OUTPATIENT)
Dept: ENDOCRINOLOGY | Facility: CLINIC | Age: 29
End: 2024-02-22
Payer: COMMERCIAL

## 2024-04-18 ENCOUNTER — OFFICE VISIT (OUTPATIENT)
Dept: ENDOCRINOLOGY | Facility: CLINIC | Age: 29
End: 2024-04-18
Payer: COMMERCIAL

## 2024-04-18 ENCOUNTER — LAB (OUTPATIENT)
Dept: LAB | Facility: LAB | Age: 29
End: 2024-04-18
Payer: COMMERCIAL

## 2024-04-18 VITALS
TEMPERATURE: 98.3 F | HEART RATE: 80 BPM | HEIGHT: 66 IN | BODY MASS INDEX: 29.73 KG/M2 | DIASTOLIC BLOOD PRESSURE: 72 MMHG | WEIGHT: 185 LBS | SYSTOLIC BLOOD PRESSURE: 104 MMHG

## 2024-04-18 DIAGNOSIS — N97.9 FEMALE FERTILITY PROBLEM: ICD-10-CM

## 2024-04-18 DIAGNOSIS — Z11.3 SCREENING FOR STDS (SEXUALLY TRANSMITTED DISEASES): ICD-10-CM

## 2024-04-18 DIAGNOSIS — E03.8 SUBCLINICAL HYPOTHYROIDISM: Primary | ICD-10-CM

## 2024-04-18 PROCEDURE — 99417 PROLNG OP E/M EACH 15 MIN: CPT | Performed by: OBSTETRICS & GYNECOLOGY

## 2024-04-18 PROCEDURE — 86803 HEPATITIS C AB TEST: CPT

## 2024-04-18 PROCEDURE — 87389 HIV-1 AG W/HIV-1&-2 AB AG IA: CPT

## 2024-04-18 PROCEDURE — 87340 HEPATITIS B SURFACE AG IA: CPT

## 2024-04-18 PROCEDURE — 86780 TREPONEMA PALLIDUM: CPT

## 2024-04-18 PROCEDURE — 36415 COLL VENOUS BLD VENIPUNCTURE: CPT

## 2024-04-18 PROCEDURE — 99215 OFFICE O/P EST HI 40 MIN: CPT | Performed by: OBSTETRICS & GYNECOLOGY

## 2024-04-18 PROCEDURE — 87800 DETECT AGNT MULT DNA DIREC: CPT

## 2024-04-18 PROCEDURE — 99215 OFFICE O/P EST HI 40 MIN: CPT | Mod: 25 | Performed by: OBSTETRICS & GYNECOLOGY

## 2024-04-18 RX ORDER — LEVOTHYROXINE SODIUM 50 UG/1
50 TABLET ORAL DAILY
Qty: 30 TABLET | Refills: 11 | Status: SHIPPED | OUTPATIENT
Start: 2024-04-18 | End: 2025-04-18

## 2024-04-18 ASSESSMENT — PAIN SCALES - GENERAL: PAINLEVEL: 0-NO PAIN

## 2024-04-18 NOTE — PROGRESS NOTES
Visit Type: In Person    Interval history: They went to [place] in Sept and came back in October and saw me in November. [place] the [place] 3 [place]. They did 4 IUIs without success. They have done 2 IUI in Nov and Dec since her SAB.   She is still taking synthroid but not metformin. She had difficulty with more than 1 pill per day. Order was placed for TSH but they had it done in [place] - he will send to me the report.   He went to an MD in [facility] - he had STD testing and a DNA fragmentation that was overall normal.   Poss mild PCOS given apperance of ovaries on US and oligomenorrhea and AMH = 4.6.   She is currently CD 9. LMP was 24.       Past History Reviewed and Affirmed Below:  History of Present Illness: INTERVAL HISTORY:    [place] for past few months. Was reviewing labs from August and was wanting to know where to  based on last labs. Not taking Metformin. Stopped taking Synthroid. Was taking Clomid 150mg this month, last pill was this . Today is cycle day 8. For the past two months, periods have been regular. Did not do a urine pregnancy test.   BMI = 28    History of Present IllnessINT Hx: Here for new plan of care after D&C for aneuploid pregnancy loss.  (SAB x 2)  Anora 46XX  Taking synthroid 50mcg for elevated TSH and has not had a repeat TSH done. Has taken letrozole 5mg and progesterone in the past.   She is positive for alpha thal but he is not.   + hx oligomenorrhea, ovaries appear somewhat PCOS on US - AMH = 4.6  SA was WNL  IUI the month of conception with clomid 150mg and OPK showed TMC of 3.9, she did use BID progesterone     Prior HPI:  History of Present Illness  Referred by: Dr. Watson     Accompanied today by:      DATE OF COVID VACCINE: Pfizer x2     History of present illness: Patient is a 27 year year old - passed on own in 2021- early SAB (potentially another false positive on a kit in [place]) female who presents with trying  to conceive x 2 years.      PRIOR EVALUATION / TREATMENT: letrozole x 6 months ? (2.5mg and 5mg)     AMH:   TSH: 3.19 ([facility])  PRL:   Blood Type:  pap normal   DHEAS:  HSG  normal  Rubella immune  [facility]  Varicella immune  [facility]  FSH: 8.8 ([facility] 10/2022)  Testosterone 24 10/22 [facility]  Other: SA from - Vol 3.1, Conc: 44 mil, Mot 50%,  mil, TMS 68 mil     Pelvic ult at [facility] in 10/222 normal     RELATIONSHIP STATUS:      OB Hx: (year, GA, mode, complications, name)  1 early SAB she passed on her own in .     GYN HISTORY:  History of STI or PID: no  Last pap smear: - at [facility] will send us the records  History of abnormal paps: no  Mammogram: no  Coitus: x/fertile week: 5-6 x   Pelvic pain: no  Pain with intercourse, bowel movements or full bladder: no     MENSTRUAL HISTORY:   LMP: 3/21/23  Menarche: 10  Contraception: no  Cycle length: q 30-34 days  Bleeding length: 3-5 days  Heavy bleeding 1-3 heavier and then very light  Dysmenorrhea: cramping x 1 day  OPK is positive around cd 18-21     ENDOCRINE/INFERTILITY HISTORY:  Duration of infertility: 2 years  Coital Activity/week: 5-6 x  Nipple Discharge: no  Vision changes / headaches: is prone to headaches  Excess hair growth: no  Acne/oily skin: no  Recent weight change: no  Exercise: yes, 5 x a week  PMH: none     MEDICATION: PNV, vitamin D     PSH: skin tag removed of rectum     PSYCH HISTORY: No     SOCIAL HISTORY:  Occupation:   Smoking: no  Alcohol: no  Drug use: no        PARTNER HISTORY:  PARTNER HISTORY: Pooja Newmanvijayemmanuelle  Age-  89  Occupation-   Prior fertility history: no  PMH: no  PSH: skin tag removal   Smoking: occasional  Alcohol Use: no  Drug Use: no  Medications: no  Injuries /STDs: no  SA: yes at [facility]     FAMILY HISTORY:  Cancer history (breast, ovarian, colon): No     GENETIC HISTORY:   Ethnic Background:   Patient:    Partner:    Genetic Disease in Family:  no  Birth Defects in Family: no  Genetic screening performed previously: no     NO CONCORDANCE  FOR MIKE Newman 2bP:   POS ALONAMALASEMMIA  For SARAH Myriad:  POS KRABBE, CANAVAN     27 yo  (SAB x 2), recent loss confirmed euploid with Anora 46XX  ? mild male factor  ? poss mild PCOS     PLAN  - Metformin to be maintained at  500mg ER dose - ok not to increase as she had bad side effects  - Discussed clomid 150mg CD 3-7 with cycle monitoring trigger and IUI and plan progesterone in LP + metformin with the start of her NEXT menses.   - To start coated aspirin 81mg daily for empiric RPL prevention. Consider prednisone 5-10mg with pos preg test.   - Fertility Reviewed letrozole 7.5mg with monitoring and trigger and poss IUI next day (last time her prog was increasing) with LP prog 100mg BID PV. They may also prefer to do OPKs. They are also interested in SO. Reviewed this in detail including risk of higher order multiples and poss fetal reduction. Will ask Rns to do BP for this. If not pregnant, may want to proceed with IVF. If they decide to do SO will do letrozole 7.5mg x 5 with starting menopur 75 Ius daily until time of trigger.   - They prefer to save on sperm costs and they would be ok to do IVF WITHOUT A FREEZE  - Call to schedule an IVF consult  - Chart to RN for follow up    Maria García 24 4:25 PM

## 2024-04-19 LAB
C TRACH RRNA SPEC QL NAA+PROBE: NEGATIVE
HBV SURFACE AG SERPL QL IA: NONREACTIVE
HCV AB SER QL: NONREACTIVE
HIV 1+2 AB+HIV1 P24 AG SERPL QL IA: NONREACTIVE
N GONORRHOEA DNA SPEC QL PROBE+SIG AMP: NEGATIVE
TREPONEMA PALLIDUM IGG+IGM AB [PRESENCE] IN SERUM OR PLASMA BY IMMUNOASSAY: NONREACTIVE

## 2024-05-13 DIAGNOSIS — N97.9 FEMALE INFERTILITY: ICD-10-CM

## 2024-05-13 PROCEDURE — RXMED WILLOW AMBULATORY MEDICATION CHARGE

## 2024-05-13 RX ORDER — CHORIONIC GONADOTROPIN 10000 UNIT
10000 KIT INTRAMUSCULAR ONCE AS NEEDED
Qty: 1 EACH | Refills: 0 | Status: SHIPPED | OUTPATIENT
Start: 2024-05-13

## 2024-05-13 RX ORDER — LETROZOLE 2.5 MG/1
7.5 TABLET, FILM COATED ORAL DAILY
Qty: 10 TABLET | Refills: 0 | Status: SHIPPED | OUTPATIENT
Start: 2024-05-13 | End: 2024-08-11

## 2024-05-13 NOTE — PROGRESS NOTES
Telephone call to patient today to discuss set up of SO IUI cycle after BP cleared by YESSI De La Fuente NP.  Reviewed with patient LMP 5/11/24 and patient to come tomorrow, 5/14/24 CD 4, for baseline follicle scan and E2. Plan to start letrozole 7.5mg tomorrow afternoon and Menopur 75 units on 5/17/24 in the evening, return for monitoring on 5/20/24. Patient and spouse have numerous questions about timing as they state Dr. García told them the letrozole would start on CD3. Explained to patient that letrozole can be started as late as CD5 and the baseline appointment is necessary because they are using injectable stimulation medication - both verbalize understanding.   Patient states she has her letrozole and menopur but no trigger shot. Rx entered for Pregnyl trigger to [facility]. Patient transferred to  to schedule her baseline for tomorrow.  Doc Rosa 05/13/24 11:19 AM

## 2024-05-14 ENCOUNTER — SPECIALTY PHARMACY (OUTPATIENT)
Dept: PHARMACY | Facility: CLINIC | Age: 29
End: 2024-05-14

## 2024-05-14 ENCOUNTER — ANCILLARY PROCEDURE (OUTPATIENT)
Dept: ENDOCRINOLOGY | Facility: CLINIC | Age: 29
End: 2024-05-14

## 2024-05-14 DIAGNOSIS — N97.9 FEMALE INFERTILITY: ICD-10-CM

## 2024-05-14 LAB — ESTRADIOL SERPL-MCNC: <20 PG/ML

## 2024-05-14 PROCEDURE — 36415 COLL VENOUS BLD VENIPUNCTURE: CPT

## 2024-05-14 PROCEDURE — 76857 US EXAM PELVIC LIMITED: CPT

## 2024-05-14 PROCEDURE — 76857 US EXAM PELVIC LIMITED: CPT | Performed by: OBSTETRICS & GYNECOLOGY

## 2024-05-14 RX ORDER — LETROZOLE 2.5 MG/1
7.5 TABLET, FILM COATED ORAL DAILY
Qty: 15 TABLET | Refills: 0 | OUTPATIENT
Start: 2024-05-14 | End: 2024-08-12

## 2024-05-20 ENCOUNTER — TELEPHONE (OUTPATIENT)
Dept: ENDOCRINOLOGY | Facility: CLINIC | Age: 29
End: 2024-05-20
Payer: COMMERCIAL

## 2024-05-20 ENCOUNTER — APPOINTMENT (OUTPATIENT)
Dept: ENDOCRINOLOGY | Facility: CLINIC | Age: 29
End: 2024-05-20
Payer: COMMERCIAL

## 2024-05-20 NOTE — PROGRESS NOTES
Patient states she was unable to come in for appointment today as she is OOT today and tomorrow. Patient states she moved her appointment until Wednesday 5/22/24. Patient instructed this RN will have to discuss medication plan with providers in today's meeting and call her with the plan. Patient agreeable and denies further questions or concerns.  CINTHYA HYLTON on 5/20/24 at 12:25 PM.     HUDDLE PROVIDER NOTE  Ultrasound and/or labs reviewed at [facility]   No results found for this or any previous visit (from the past 96 hour(s)).  Mon 5/20 (Day 10)   menotropins recon soln injection: Inject 75 Units once daily in the evening under the skin    Pt is out of town for a last minute work trip. Called her and stated we prefer to monitor patients who are undergoing injectable medications to determine follicle growth and risk for multiples. Provided option of canceling now versus continuing with appt on Wednesday (with the possibility of cancellation based on results of ultrasound). She is going to try to return to [facility] and reschedule her ultrasound to tomorrow AM. She understands she may be cancelled based on the results tomorrow.    RTC in one day for Follicle scan and Estradiol and progesterone and lutenizing hormone.   Sarah Quintero  05/20/2024  1:37 PM    Patient called and scheduled appointment for tomorrow 5/21/24.   CINTHYA HYLTON on 5/20/24 at 1:43 PM.

## 2024-05-20 NOTE — TELEPHONE ENCOUNTER
Returned patient call regarding SO cycle. Patient states she was unable to come in for appointment today as she is OOT today and tomorrow. Patient states she moved her appointment until Wednesday 5/22/24. Patient instructed this RN will have to discuss medication plan with providers in today's meeting and call her with the plan. Patient agreeable. Patient states she will use last HMG tomorrow night. Patient instructed she can call Wednesday after appointment for refill. Patient agreeable and denies further questions or concerns. Patient added to noon huddle for today to discuss with team.   CINTHYA HYLTON on 5/20/24 at 12:00 PM.

## 2024-05-20 NOTE — TELEPHONE ENCOUNTER
Reason for call: Patient is calling to talk to a nurse about her medications. Please call her   Notes:

## 2024-05-21 ENCOUNTER — ANCILLARY PROCEDURE (OUTPATIENT)
Dept: ENDOCRINOLOGY | Facility: CLINIC | Age: 29
End: 2024-05-21

## 2024-05-21 ENCOUNTER — PHARMACY VISIT (OUTPATIENT)
Dept: PHARMACY | Facility: CLINIC | Age: 29
End: 2024-05-21
Payer: COMMERCIAL

## 2024-05-21 ENCOUNTER — SPECIALTY PHARMACY (OUTPATIENT)
Dept: PHARMACY | Facility: CLINIC | Age: 29
End: 2024-05-21

## 2024-05-21 DIAGNOSIS — N97.9 FEMALE INFERTILITY: ICD-10-CM

## 2024-05-21 LAB — ESTRADIOL SERPL-MCNC: 74 PG/ML

## 2024-05-21 PROCEDURE — 36415 COLL VENOUS BLD VENIPUNCTURE: CPT

## 2024-05-21 PROCEDURE — 76857 US EXAM PELVIC LIMITED: CPT

## 2024-05-21 NOTE — PROGRESS NOTES
CYCLING NOTE    Here for US and/or lab monitoring; relevant findings reviewed. Patient is 28 years old. Patient of Dr. García. AMH- 4.598. Patient is here for IUI #5 1st SO cycle. Patient took Letrozole 7.5 mg and is currently taking HMG 75 units. Patient is day 5 of stimulation (was unable to RTC yesterday as patient was OOT). Patient instructed to call [facility] for delivery of more HMG and trigger shot.     Patient stayed for nurse visit. Pain is 0/10  Team will contact patient later today with results and plan.    Cinthya Shin  05/21/2024  9:55 AM    [facility]  Ultrasound and/or labs reviewed at [facility].   Results for orders placed or performed in visit on 05/21/24 (from the past 96 hour(s))   Estradiol   Result Value Ref Range    Estradiol 74 pg/mL     Tue 5/21 (Day 11)   menotropins recon soln injection: Inject 75 Units once daily in the evening under the skin    Wed 5/22 (Day 12)   menotropins recon soln injection: Inject 75 Units once daily in the evening under the skin    RTC in two days for Follicle scan and Estradiol, Progesterone.  D/w Dr. Kristin Ortiz  05/21/2024  1:03 PM    Spoke with patient regarding above plan as well as sent my chart message to patient with plan. Patient instructed to continue HMG 75 units and RTC on Thursday for repeat monitoring. Patient scheduled on her way out this AM. Patient agreeable. Patient denies further questions or concerns.   CINTHYA SHIN on 5/21/24 at 1:45 PM.

## 2024-05-22 ENCOUNTER — APPOINTMENT (OUTPATIENT)
Dept: ENDOCRINOLOGY | Facility: CLINIC | Age: 29
End: 2024-05-22
Payer: COMMERCIAL

## 2024-05-23 ENCOUNTER — ANCILLARY PROCEDURE (OUTPATIENT)
Dept: ENDOCRINOLOGY | Facility: CLINIC | Age: 29
End: 2024-05-23

## 2024-05-23 ENCOUNTER — PHARMACY VISIT (OUTPATIENT)
Dept: PHARMACY | Facility: CLINIC | Age: 29
End: 2024-05-23
Payer: COMMERCIAL

## 2024-05-23 DIAGNOSIS — N97.9 FEMALE INFERTILITY: ICD-10-CM

## 2024-05-23 LAB
ESTRADIOL SERPL-MCNC: 103 PG/ML
PROGEST SERPL-MCNC: 0.9 NG/ML

## 2024-05-23 PROCEDURE — 76857 US EXAM PELVIC LIMITED: CPT

## 2024-05-23 PROCEDURE — 36415 COLL VENOUS BLD VENIPUNCTURE: CPT

## 2024-05-23 PROCEDURE — RXMED WILLOW AMBULATORY MEDICATION CHARGE

## 2024-05-23 PROCEDURE — 76857 US EXAM PELVIC LIMITED: CPT | Performed by: OBSTETRICS & GYNECOLOGY

## 2024-05-23 NOTE — PROGRESS NOTES
CYCLING NOTE    Here for US and/or lab monitoring for IUI #5, 1st Super Ovulation IUI; relevant findings reviewed.  Today is day 7 of HMG 75 units after Letrozole 7.5mg  Patient stayed for nurse visit. Pain is 0/10.  Team will contact patient later today with results and plan.    Doc Rosa  05/23/2024  8:16 AM    [facility]  Ultrasound and/or labs reviewed at huddle. Patient ready for trigger.   Results for orders placed or performed in visit on 05/23/24 (from the past 96 hour(s))   Estradiol   Result Value Ref Range    Estradiol 103 pg/mL   Progesterone   Result Value Ref Range    Progesterone 0.9 ng/mL     Thu 5/23 (Day 13)   menotropins recon soln injection: Inject 75 Units once daily in the evening under the skin    Fri 5/24 (Day 14)   chorionic gonadotropin injection: Inject 10,000 Units 1 time if needed under the skin      HCG trigger tomorrow night between 8-10pm for intrauterine insemination on Sunday, 5/26/2024.    D/w Dr. Raquel Ortiz  05/23/2024  12:49 PM    Phone call to patient, reviewed plan as noted above, patient verbalizes understanding at this time and denies additional questions. Patient also sent [facility] message with above plan noted. Will call [facility] today to get a dose of Menopur and her Pregnyl trigger delivered.  Doc Rosa 05/23/24 1:44 PM

## 2024-05-26 ENCOUNTER — PROCEDURE VISIT (OUTPATIENT)
Dept: ENDOCRINOLOGY | Facility: CLINIC | Age: 29
End: 2024-05-26

## 2024-05-26 DIAGNOSIS — Z31.89 ENCOUNTER FOR ARTIFICIAL INSEMINATION: ICD-10-CM

## 2024-05-26 PROCEDURE — 89261 SPERM ISOLATION COMPLEX: CPT | Performed by: OBSTETRICS & GYNECOLOGY

## 2024-05-26 PROCEDURE — 58322 ARTIFICIAL INSEMINATION: CPT | Performed by: OBSTETRICS & GYNECOLOGY

## 2024-05-26 PROCEDURE — 58322 ARTIFICIAL INSEMINATION: CPT | Mod: GC | Performed by: OBSTETRICS & GYNECOLOGY

## 2024-05-26 NOTE — PROGRESS NOTES
"Patient ID: Delia Hernández is a 28 y.o. female.    Intrauterine Insemination (IUI)    Date/Time: 5/26/2024 10:10 AM    Performed by: Sarah Qunitero MD  Authorized by: MATA Olmos-CNP    Consent:     Consent obtained:  Verbal and written    Consent given by:  Patient    Procedure risks and benefits discussed: yes      Patient questions answered: yes      Patient agrees, verbalizes understanding, and wants to proceed: yes      Educational handouts given: yes      Instructions and paperwork completed: yes    Procedure:     Pelvic exam performed: yes      Speculum placed in vagina: yes      Tenaculum applied to cervix: no      Type of insemination: intrauterine insemination      Ultrasound guidance: No      Speculum type: Soha      Catheter type: curved      Curvature: mild      Difficulty: easy      Estimated Blood Loss:  None    Specimen Return: none    Post-procedure:     Patient tolerated procedure well: yes      Post-procedure plan: patient counseled on signs and symptoms for which to call and/or return to clinic    Comments:     Procedure comments:  IUI Procedure note:    The patient presented today for IUI.     Consent signed by patient, IUI sample identified by patient, and Final Verification performed with patient via electronic system \"\" prior to insemination.   All patient's questions were discussed and answered.          Chaperone offered to patient for intimate exam: Patient declined chaperone  Name of chaperone: N/A    Specimen Source: Partner  Specimen Condition: Fresh  TMS 23.68    Additional notes:    Patient was advised to call office if she develops fever, chills, pelvic pain, or heavy bleeding.    Progesterone and post-IUI teaching completed. Will start Progesterone three days after IUI and continue twice daily. Pt will do a home pregnancy test two weeks from IUI date. If home pregnancy test positive, patient will continue Progesterone and call office to schedule [facility]. If " home pregnancy test negative, patient will discontinue Progesterone, and was advised to call office with start of menses; will proceed with another cycle if appropriate.  Patient verbalized understanding of plan.    Sarah Quintero MD PGY 5  Reproductive Endocrinology and Infertility Fellow      Attending Attestation: I was physically present for key and critical portions performed by the fellow. I reviewed the fellow's documentation and discussed the patient with the fellow. I agree with the fellow's medical decision making as documented in the fellow's note.

## 2024-05-29 ENCOUNTER — TELEPHONE (OUTPATIENT)
Dept: ENDOCRINOLOGY | Facility: CLINIC | Age: 29
End: 2024-05-29
Payer: COMMERCIAL

## 2024-05-29 DIAGNOSIS — N97.9 FEMALE INFERTILITY: ICD-10-CM

## 2024-05-29 DIAGNOSIS — N96 RECURRENT PREGNANCY LOSS WITHOUT CURRENT PREGNANCY: ICD-10-CM

## 2024-05-29 RX ORDER — PROGESTERONE 200 MG/1
200 CAPSULE ORAL 2 TIMES DAILY
Qty: 60 CAPSULE | Refills: 1 | Status: SHIPPED | OUTPATIENT
Start: 2024-05-29 | End: 2025-05-29

## 2024-05-29 NOTE — TELEPHONE ENCOUNTER
Caller: Delia  Reason for call: Medication request  Medication requested: Progesterone to be sent to [facility]

## 2024-05-29 NOTE — TELEPHONE ENCOUNTER
Send MyChart letting pt know that prometrium was sent to provider to sign off, will start it today.    05/29/24 at 10:07 AM - Maria Parra RN

## 2024-06-10 NOTE — PROGRESS NOTES
Medications ordered for upcoming IUI cycle.    Patient is being seen by the  Fertility clinic for the following:     Treatment plan:       Returned patient's call.  Patient called with menses for IUI cycle  Cycle #:  4  Medication: Clomid 150mg  Ovulation: Trigger  Sperm Source:  partner fresh  Luteal Support: Prometrium  Additional Medications: Aspirin  Boarding Pass signed off: 11/10/23 for 3 cycles  IUI order pended: yes  Additional Information: placed order for repeat TSH level as well as refills for Synthroid 50mcg and Metformin XR 500mg per patient request.      Patient aware to call for monitoring appointment on CD 14 which is 1/22/24. Advised to take Clomid 150mg beginning on CD3-7. Verbalizes understanding.  MADELYN LORA on 1/9/24 at 10:46 AM.        The following medications were sent into  Specialty Pharmacy on 12/11/2023 for the above treatment:   Ovidrel 250mcg/0.5mL syringe     Saira Shaw, Uday Shaw PharmD (Katie)  [facility]  Clinical Pharmacy Specialist- Fertility   [facility], Nandini Collado  [address] [place] [address]  Email: [email or web address]  Tel: [phone]       Fax: [phone]

## 2024-06-15 ENCOUNTER — TELEMEDICINE (OUTPATIENT)
Dept: ENDOCRINOLOGY | Facility: CLINIC | Age: 29
End: 2024-06-15
Payer: COMMERCIAL

## 2024-06-15 DIAGNOSIS — Z31.41 FERTILITY TESTING: ICD-10-CM

## 2024-06-15 DIAGNOSIS — Z01.812 ENCOUNTER FOR PREPROCEDURAL LABORATORY EXAMINATION: ICD-10-CM

## 2024-06-15 DIAGNOSIS — N97.9 FEMALE INFERTILITY: Primary | ICD-10-CM

## 2024-06-15 RX ORDER — NORGESTIMATE AND ETHINYL ESTRADIOL 0.25-0.035
1 KIT ORAL DAILY
Qty: 28 TABLET | Refills: 2 | Status: SHIPPED | OUTPATIENT
Start: 2024-06-15 | End: 2025-06-15

## 2024-06-15 NOTE — PROGRESS NOTES
Virtual or Telephone Consent: An interactive audio and video telecommunication system which permits real time communications between the patient (at the originating site) and provider (at the distant site) was utilized to provide this telehealth service and verbal consent was requested and obtained from Delia Hernández on this date, 06/15/24 for a telehealth visit.    IVF Note     Patient is a 28 y.o.  (SAB x 3) female, here for IVF consult due to RPL and she is cycle day 5 today. She has not taken any medications yet. She has no more injection medicine yet. She is taking 2 metformin a day. She is also taking ASA.   Here today with: Partner      Interval history: They went to [place] in Sept and came back in October and saw me in November. [place] in the [place] 3 [place]. They did 4 IUIs without success. They have done 2 IUI in Nov and Dec since her SAB.   She is still taking synthroid but not metformin. She had difficulty with more than 1 pill per day. Order was placed for TSH but they had it done in [place] - he will send to me the report.   He went to an MD in [facility] - he had STD testing and a DNA fragmentation that was overall normal.   Poss mild PCOS given apperance of ovaries on US and oligomenorrhea and AMH = 4.6.   She is currently CD 9. LMP was 24.     Past History Reviewed and Affirmed Below:  History of Present Illness: INTERVAL HISTORY:     [place] for past few months. Was reviewing labs from August and was wanting to know where to  based on last labs. Not taking Metformin. Stopped taking Synthroid. Was taking Clomid 150mg this month, last pill was this . Today is cycle day 8. For the past two months, periods have been regular. Did not do a urine pregnancy test.   BMI = 28    History of Present IllnessINT Hx: Here for new plan of care after D&C for aneuploid pregnancy loss.  (SAB x 2)  Anora 46XX  Taking synthroid 50mcg for elevated TSH and has not had a repeat TSH  done. Has taken letrozole 5mg and progesterone in the past.   She is positive for alpha thal but he is not.   + hx oligomenorrhea, ovaries appear somewhat PCOS on US - AMH = 4.6  SA was WNL  IUI the month of conception with clomid 150mg and OPK showed TMC of 3.9, she did use BID progesterone    Past Infertility Treatments: See MACHO Cycle tab  Fertility Cycles       Cycle Name Treatment Start Date Type Outcome    IUI #5 - 1st S.O. 05/11/2024 IUI Active    IUI #4 01/09/2024 IUI No Pregnancy    IUI #3 12/08/2023 IUI No Pregnancy    IUI # 2 11/08/2023 IUI No Pregnancy              GYN HISTORY   HX of abnormal Mammo: No  Pap smear: No results found for requested labs within last 365 days.  HPV: No results found for requested labs within last 1825 days.     PMH  Past Medical History:   Diagnosis Date    History of miscarriage      History of any clotting: No  History of hospitalizations or surgeries: No  History of easy bleeding/bruising: No    PSH  Past Surgical History:   Procedure Laterality Date    DILATION AND CURETTAGE OF UTERUS       Social history  Social History     Tobacco Use    Smoking status: Never    Smokeless tobacco: Never   Substance Use Topics    Alcohol use: Not Currently    Drug use: Never     Current smoker: No    Family history  Cognitive deficits: No  Birth defects: No  Other:     Current Meds  Current Outpatient Medications   Medication Sig Dispense Refill    chorionic gonadotropin (Pregnyl) 10,000 unit injection Reconstitute according to instructions and inject 10,000 units (1 mL) under the skin as a one time dose, as directed per provider for trigger. 1 each 0    clomiPHENE (Clomid) 50 mg tablet Take 3 tablets (150 mg) by mouth once daily. Take 3 tablets by mouth cycle days 3-7 as directed 15 tablet 0    letrozole (Femara) 2.5 mg tablet Take 3 tablets (7.5 mg total) by mouth once daily.  Cycle days 3-7; After negative urine pregnancy test every cycle before starting letrozole. 10 tablet 0     levothyroxine (Synthroid, Levoxyl) 50 mcg tablet Take 1 tablet (50 mcg) by mouth once daily. 30 tablet 11    menotropins (Menopur) 75 unit recon soln injection Reconstitute and inject 75 Units under the skin once daily in the evening. Inject under the skin as directed by provider 10 each 2    metFORMIN XR (Glucophage-XR) 750 mg 24 hr tablet Take 1 tablet (750 mg) by mouth once daily in the evening. Take with meals. Do not crush, chew, or split.  Increase to 1500mg daily if able to tolerate higher dose. 30 tablet 1    metFORMIN  mg 24 hr tablet Take 2 tablets (1,000 mg) by mouth once daily in the evening. Take with meals. Do not crush, chew, or split. 60 tablet 0    norgestimate-ethinyl estradioL (Ortho-Cyclen) 0.25-35 mg-mcg tablet Take 1 tablet by mouth once daily. Take active pills only as directed per provider 28 tablet 2    progesterone (Prometrium) 200 mg capsule Insert 1 capsule (200 mg) into the vagina 2 times a day. Starting 3 days after IUI 60 capsule 1     No current facility-administered medications for this visit.       Allergies  Patient has no known allergies.    Partner History  Name: MEGAN TURNER (MRN: [id number])  SA in past year:     VITALS:  There were no vitals taken for this visit.  BMI:   BMI Readings from Last 1 Encounters:   24 29.86 kg/m²       ASSESSMENT   28 y.o.  female with RPL and secondary infertility x 2 years, poss mild PCOS  Indication for IVF: Recurrent Pregnancy Loss and Polycystic Ovarian Syndrome  Partner SA: Normal    We reviewed IVF and discussed the following:   In-vitro fertilization and embryo transfer  Stimulation protocols   Oocyte retrieval, risks    Cryopreservation   Assessment of fertilization   Embryo development  Statistics  ICSI/Assisted hatching   Embryo transfer and preparation    Risks of OHSS and multiple gestation   Cancelled cycles   Use of birth control   Selective reduction   Number of embryos to transfer   Ectopic pregnancy  and  miscarriage  Team based care  Informed consent procedures  Folic acid supplementation   Genetic carrier screening   PGT  Frozen tissue storage and transport process  Discussed that PAP and mammogram must be updated if appropriate based on age and clinical  history and results received before treatment can begin.    ART Cycle Plan    1. Protocol/Fertility Plan Update:   Lead in: OCP  Stimulation protocol: Antagonist with  and menopur 75  Trigger plan: HCG vs Lupron  Pre-retrieval meds: Antibiotics per protocol  Adjuncts:  NONE  Notes:     2. FET:  Protocol: Programmed  Adjuncts:  ASA 81 mg, Doxy, and Prednisone  OCP candidate: Yes  Notes: CONTINUE METFORMIN    3. Insemination:  Sperm source: partner  Sperm collection method: Fresh no Backup   Notes:  ICSI: Yes  # of oocytes to be fertilized: all    4. Transfer:   Number of embryos to replace: 1 PGTA WNL  Stage of embryo transfer: day 5  Trial transfer needed? No    5. Cryopreservation plan  PGT: PGTA   Freeze all? Yes  Oocyte cryopreservation: No    6. Patient willing to accept blood transfusion: Yes    7. RN to review chart, initiate IVF boarding pass, and assure completion of the following prior to proceeding with IVF stimulation:       Orders Placed This Encounter   Procedures    Hysteroscopy diagnostic    Anti-Mullerian Hormone (AMH)    POCT pregnancy, urine manually resulted       STDs (Hepatitis B, Hepatitis C, HIV, Syphilis, GC/CT) for patient and partner (if applicable) to be completed within the last year (z11.3)  Genetic carrier testing: waiver or carrier screen completed with clearance documentation by provider for both patient and partner (z13.71)  Rubella and varicella to be completed within the last five years (z11.59)   TSH to be completed within the last year (z13.29)  Type & Screen to be completed within the last year (z01.83)  AMH to be completed within the last year (z31.41)  Pre-IVF Imaging: Reference any orders placed by provider.  Cavity  evaluation: hysteroscopy  Frozen sperm sample: ensure frozen partner sample (z31.41) or verify donor sperm on site prior to stimulation start date.  Verify in EMR or obtain copy of patient’s last mammogram (if applicable) and pap smear results for provider review in boarding pass.  Enroll in Engaged MD and complete annual consent forms for IVF and cryotransport agreements.  BMI checklist for BMI <18 or >40  Consults: Nursing and Financial Consult.  PAT Consult: No  Ectopic Risk: No  Medically Complex: No  Additional consults  NONE  and review what is in the boarding pass.    Ok to avoid sperm freeze- fresh only   Would really like a call from the financial counselor- self pay  Chart to Shani, may start OCP tonight (CD 4/5) in preparation for IVF asap  NEED TO CHECK ON MYRIAD RESULTS FOR BOTH    Maria García  06/15/2024  10:14 AM

## 2024-06-18 NOTE — PROGRESS NOTES
Boarding Pass IVF    Age: 28 y.o.    Provider: Maria García MD  Primary RN: Doc Rosa  Reasons for Treatment: Unexplained Infertility, Recurrent Pregnancy Loss, and Polycystic Ovarian Syndrome  Last BMI  24 : 28.57 kg/m²       Past Medical History:   Diagnosis Date    History of miscarriage        Date Done Consultation Results/Comments   6/15/24 Medication Protocol Lead in: OCP  Fertility Plan Update:   Stimulation protocol: Antagonist with  and menopur 75  Trigger plan: HCG vs Lupron  Pre-retrieval meds: Antibiotics per protocol  Adjuncts:  NONE  Notes: -   6/15/24 IVF Consult  [x]    PGT-A/M? Yes; Req Sent: Yes; PGT-M Test Ready: No n/a; Company: [employer]   7/15/24 IVF Information and Authorization (to be completed annually) Received and in chart: Yes (Doc Rosa RN)   7/15/24 UH Waiver (Out) Form Received and in chart: Yes (Doc Rosa RN)   7/15/24 [employer] Packet [x]    24 Procedure Order Placed [x]        MFM Consult Okay to proceed? N/A    Psych Consult Okay to proceed? N/A    Genetics Consult Okay to proceed? N/A    Other    Date Done Female Labs Results/Comments   No results found for requested labs within last 365 days.    REPEAT AT BASELINE T&S (Q 1 Year) ABO: No results found for requested labs within last 365 days.  Rh: No results found for requested labs within last 365 days.  Antibody: No results found for requested labs within last 365 days.     2024 Hep B sAg Nonreactive   2024 Hep C AB Nonreactive   2024 HIV Nonreactive   2024 Syphilis Nonreactive   2024 GC/CT GC: Negative  CT: Negative   3/28/2023 Rubella (Q 5 Years) POSITIVE   3/28/2023 Varicella (Q 5 Years) POSITIVE   2023 TSH 2.53 (Ref range: 0.44 - 3.98 mIU/L)   3/8/23 HgbA1C 5.2   2024 AMH 3.761   23 Carrier Screening Myriad 2bP:   Authorization completed 7/15/24  Pos CARRIER  alpha thalassemia, HBA1/HBA2 - related    Uterine Cavity Eval Pelvic US:  3/28/23:  Impression   Unremarkable pelvic survey.     HSG: 3/28/23:  Uterus - normal contour without filling defects   Tubes- bilateral patency with normal architecture    SIS:     Hyster: (6/21/2024) Procedure: Diagnostic Hysteroscopy   Preop diagnosis: fertility testing  Post op diagnosis: Same   Assistant: Dr. Doyle    Anesthesia: None   IV: None   EBL: 3 cc  Specimens: None   Complications: None   Risks benefits and alternatives of the procedure explained to the patient and informed consent was obtained. Urine pregnancy test was performed and was negative. Time out was performed. The patient was placed in the dorsal lithotomy position and a sterile speculum was placed in the vagina. The cervix was sterilized with Betadine x3. Paracervical block with lidocaine 1% was administered.   Tenaculum: Yes  Dilation: No  The hysteroscope was placed in the cervix and advanced into the uterine cavity. Normal saline was used for distension media. Images were obtained and findings noted as below.   All instruments were then removed. Good hemostasis was noted. Patient tolerated the procedure well returned to the recovery area in stable condition.   Findings:   Cavity: Smooth cavity no lesions noted  Ostia: Bilateral tubal ostia visualized  Additional Notes:    Attending Attestation: I was physically present for key and critical portions performed by the fellow. I reviewed the fellow's documentation and discussed the patient with the fellow. I agree with the fellow's medical decision making as documented in the fellow's note.   James Wu 06/21/24 11:19 AM                  Trial Transfer Needs at retrieval? 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,  Krabbe disease    5/26/2024 Semen Analysis  Volume(mL): 2.5  Concentration(million/mL): 52.63  Motility(%): 74  Motile Count(million): 23.68   6/15/24 OK to not do freeze per RF Sperm Freeze  # of vials:   TMS post thaw:    Date Done Miscellaneous Results/Comments   N/A BMI Checklist  BMI > 40 or < 18    N/A >= 45 Checklist     **Does not need to be completed prior to placing on IVF calendar**    MD Completion:  PAT needed: No  Ectopic Risk: No  Medically Complex: No    Needs T&S repeated at baseline  Otherwise okay to proceed  Natalie Foster 07/25/24 3:41 PM    FET after IVF Boarding pass deleted from this BP.   Doc Rosa 09/10/24 3:25 PM

## 2024-06-21 ENCOUNTER — ANCILLARY PROCEDURE (OUTPATIENT)
Dept: ENDOCRINOLOGY | Facility: CLINIC | Age: 29
End: 2024-06-21

## 2024-06-21 ENCOUNTER — PREP FOR PROCEDURE (OUTPATIENT)
Dept: ENDOCRINOLOGY | Facility: CLINIC | Age: 29
End: 2024-06-21
Payer: COMMERCIAL

## 2024-06-21 ENCOUNTER — HOSPITAL ENCOUNTER (OUTPATIENT)
Dept: ENDOCRINOLOGY | Facility: CLINIC | Age: 29
Discharge: HOME | End: 2024-06-21

## 2024-06-21 VITALS
SYSTOLIC BLOOD PRESSURE: 113 MMHG | DIASTOLIC BLOOD PRESSURE: 78 MMHG | HEART RATE: 66 BPM | RESPIRATION RATE: 16 BRPM | BODY MASS INDEX: 28.45 KG/M2 | WEIGHT: 177 LBS | OXYGEN SATURATION: 98 % | TEMPERATURE: 97.5 F | HEIGHT: 66 IN

## 2024-06-21 DIAGNOSIS — Z31.41 FERTILITY TESTING: ICD-10-CM

## 2024-06-21 DIAGNOSIS — Z01.812 ENCOUNTER FOR PREPROCEDURAL LABORATORY EXAMINATION: ICD-10-CM

## 2024-06-21 LAB — PREGNANCY TEST URINE, POC: NEGATIVE

## 2024-06-21 PROCEDURE — 83516 IMMUNOASSAY NONANTIBODY: CPT

## 2024-06-21 PROCEDURE — 7100000009 HC PHASE TWO TIME - INITIAL BASE CHARGE

## 2024-06-21 PROCEDURE — 7100000010 HC PHASE TWO TIME - EACH INCREMENTAL 1 MINUTE

## 2024-06-21 PROCEDURE — 36415 COLL VENOUS BLD VENIPUNCTURE: CPT

## 2024-06-21 PROCEDURE — 81025 URINE PREGNANCY TEST: CPT | Performed by: OBSTETRICS & GYNECOLOGY

## 2024-06-21 PROCEDURE — 58555 HYSTEROSCOPY DX SEP PROC: CPT | Performed by: OBSTETRICS & GYNECOLOGY

## 2024-06-21 PROCEDURE — 64450 NJX AA&/STRD OTHER PN/BRANCH: CPT | Performed by: OBSTETRICS & GYNECOLOGY

## 2024-06-21 RX ORDER — KETOROLAC TROMETHAMINE 30 MG/ML
30 INJECTION, SOLUTION INTRAMUSCULAR; INTRAVENOUS ONCE AS NEEDED
OUTPATIENT
Start: 2024-06-21 | End: 2024-06-26

## 2024-06-21 RX ORDER — ACETAMINOPHEN 325 MG/1
650 TABLET ORAL ONCE AS NEEDED
OUTPATIENT
Start: 2024-06-21

## 2024-06-21 RX ORDER — LIDOCAINE HYDROCHLORIDE 10 MG/ML
10 INJECTION, SOLUTION EPIDURAL; INFILTRATION; INTRACAUDAL; PERINEURAL ONCE
OUTPATIENT
Start: 2024-06-21

## 2024-06-21 ASSESSMENT — PAIN SCALES - GENERAL
PAINLEVEL_OUTOF10: 1
PAINLEVEL_OUTOF10: 0 - NO PAIN

## 2024-06-21 ASSESSMENT — PAIN DESCRIPTION - DESCRIPTORS: DESCRIPTORS: CRAMPING

## 2024-06-21 ASSESSMENT — PAIN - FUNCTIONAL ASSESSMENT
PAIN_FUNCTIONAL_ASSESSMENT: 0-10
PAIN_FUNCTIONAL_ASSESSMENT: 0-10

## 2024-06-21 NOTE — PROGRESS NOTES
Patient ID: Delia Hernández is a 28 y.o. female.    Hysteroscopy diagnostic    Date/Time: 6/21/2024 11:19 AM    Performed by: James Wu MD  Authorized by: Maria García MD    Consent:     Consent obtained:  Verbal and written    Consent given by:  Patient    Risks, benefits, and alternatives were discussed: yes      Risks discussed:  Bleeding, infection and pain  Universal protocol:     Procedure explained and questions answered to patient or proxy's satisfaction: yes      Relevant documents present and verified: yes      Test results available: yes      Imaging studies available: yes      Required blood products, implants, devices, and special equipment available: yes      Immediately prior to procedure, a time out was called: yes      Patient identity confirmed:  Verbally with patient, arm band and hospital-assigned identification number  Pre-procedure details:     Skin preparation:  Povidone-iodine  Sedation:     Sedation type:  None  Anesthesia:     Anesthesia method:  Nerve block    Block location:  Paracervical    Block anesthetic:  Lidocaine 1% w/o epi  Procedure specific details:      Procedure: Diagnostic Hysteroscopy   Preop diagnosis: fertility testing  Post op diagnosis: Same   Assistant: Dr. Doyle    Anesthesia: None   IV: None   EBL: 3 cc  Specimens: None   Complications: None   Risks benefits and alternatives of the procedure explained to the patient and informed consent was obtained. Urine pregnancy test was performed and was negative. Time out was performed. The patient was placed in the dorsal lithotomy position and a sterile speculum was placed in the vagina. The cervix was sterilized with Betadine x3. Paracervical block with lidocaine 1% was administered.   Tenaculum: Yes  Dilation: No  The hysteroscope was placed in the cervix and advanced into the uterine cavity. Normal saline was used for distension media. Images were obtained and findings noted as below.   All instruments were  then removed. Good hemostasis was noted. Patient tolerated the procedure well returned to the recovery area in stable condition.   Findings:   Cavity: Smooth cavity no lesions noted  Ostia: Bilateral tubal ostia visualized  Additional Notes:    Attending Attestation: I was physically present for key and critical portions performed by the fellow. I reviewed the fellow's documentation and discussed the patient with the fellow. I agree with the fellow's medical decision making as documented in the fellow's note.   James Wu 06/21/24 11:19 AM                Post-procedure details:     Procedure completion:  Tolerated well, no immediate complications

## 2024-06-25 LAB — MIS SERPL-MCNC: 3.76 NG/ML (ref 0.18–11.71)

## 2024-07-08 ENCOUNTER — TELEPHONE (OUTPATIENT)
Dept: ENDOCRINOLOGY | Facility: CLINIC | Age: 29
End: 2024-07-08
Payer: COMMERCIAL

## 2024-07-08 NOTE — TELEPHONE ENCOUNTER
Returned patient's call and advised that she has a current Rx in place for her Synthroid at  her local [facility] and according to our records she has 11 refills left. Advised patient to call [facility] and if any issues refilling her medication to call our office back.  Doc Rosa 07/08/24 3:34 PM

## 2024-07-08 NOTE — TELEPHONE ENCOUNTER
Reason for call: Medication request  Medication requested: Levothyroxine  Notes: Pt would like script sent to the [facility] in [place].

## 2024-07-12 ENCOUNTER — TELEPHONE (OUTPATIENT)
Dept: ENDOCRINOLOGY | Facility: CLINIC | Age: 29
End: 2024-07-12
Payer: COMMERCIAL

## 2024-07-12 NOTE — TELEPHONE ENCOUNTER
Telephone call returned to patient. Patient confirms LMP 7/12/2024. Plan for patient is to start OCP lead in prior to IVF stimulation. This RN discussed with patient that although her boarding pass is almost complete, it is not yet signed off. This RN made patient aware that she and her partner still need to sign Engaged MD forms. This RN informed patient that she and her partner need to decide if they will do a sperm freeze, as Dr. García gave them the option to do so. This RN also inquired on if patient knows what her insurance will or will not cover, as we may need a prior authorization to approve her egg retrieval procedure, and this takes 14 days once a request is submitted. Patient states she is unsure what her insurance covers. Patient states she and her partner will work on the Engaged MD forms this weekend. Patient states she can plan to call office on Monday to discuss further next steps. At this time, patient will be CD 4 and still within a window to start OCP if given the okay by our team. This RN send staff messages to the [employer] pool, LUCERO Lagunas RN, MAURIZIO Barrett, and primary RN SEBASTIAN Rosa RN to make all parties aware of the above information. Plan for this RN to call office Monday. Request for financial department to reach out to patient placed by this RN. All other questions answered.    07/12/24 at 5:34 PM - Yazmin Camargo RN

## 2024-07-12 NOTE — TELEPHONE ENCOUNTER
Reason for call: reporting start of cycle  LMP: 7/12  Treatment type: IVF  Note: Pt started spotting last night, today is full flow.

## 2024-07-15 DIAGNOSIS — Z01.812 PRE-PROCEDURE LAB EXAM: ICD-10-CM

## 2024-07-15 DIAGNOSIS — Z01.83 ENCOUNTER FOR RH BLOOD TYPING: ICD-10-CM

## 2024-07-15 DIAGNOSIS — N97.9 FEMALE INFERTILITY: ICD-10-CM

## 2024-07-15 PROCEDURE — RXMED WILLOW AMBULATORY MEDICATION CHARGE

## 2024-07-15 RX ORDER — CHORIONIC GONADOTROPIN 10000 UNIT
10000 KIT INTRAMUSCULAR ONCE AS NEEDED
Qty: 1 EACH | Refills: 0 | Status: SHIPPED | OUTPATIENT
Start: 2024-07-15

## 2024-07-15 RX ORDER — LEUPROLIDE ACETATE 1 MG/0.2ML
4 KIT SUBCUTANEOUS AS NEEDED
Qty: 1 KIT | Refills: 0 | Status: SHIPPED | OUTPATIENT
Start: 2024-07-15

## 2024-07-15 RX ORDER — GANIRELIX ACETATE 250 UG/.5ML
250 INJECTION, SOLUTION SUBCUTANEOUS EVERY MORNING
Qty: 5 EACH | Refills: 2 | Status: SHIPPED | OUTPATIENT
Start: 2024-07-15

## 2024-07-15 NOTE — PROGRESS NOTES
Telephone call to patient today. Her LMP was 7/12/24. Inquired if she started her OCP, patient states no. Advised patient she needs to start her OCP today as she is already CD4, patient states she will start OCP at dinner tonight. Advised patient that her consents still are not signed and last note indicated they would do them over the weekend. Patient states they are prioritizing them to complete today. Also advised patient that a decision must be made today also about freezing a sperm sample and if they decide to do that then should schedule asap with the lab. Requested patient call the office tomorrow to advise if consents have been signed and to give decision about the sperm freeze. Patient verbalizes understanding and denies additional questions at this time.    Plan for this IVF cycle: LMP 7/12/24, OCP start on 7/15/24, baseline on 7/24/24 (day 10 of OCP), med start 7/27/24.  Doc Rosa 07/15/24 12:29 PM

## 2024-07-22 ENCOUNTER — TELEPHONE (OUTPATIENT)
Dept: ENDOCRINOLOGY | Facility: CLINIC | Age: 29
End: 2024-07-22
Payer: COMMERCIAL

## 2024-07-22 NOTE — TELEPHONE ENCOUNTER
Returned patient's call, no answer, another detailed VM left indicating will take BP to [facility] tomorrow for clearance to proceed, continue OCP until cleared, call [facility] for medication delivery as they are needed by baseline. Will contact patient again to set up baseline after BP cleared this week.  Doc Rosa 07/22/24 2:14 PM

## 2024-07-22 NOTE — TELEPHONE ENCOUNTER
Returned patient's call, no answer, detailed VM left that we did receive her signed IVF consents and will take her BP to office hours tomorrow for review and clearance to proceed. Advised patient to continue taking her OCP and will reach out to schedule baseline once BP signed off. Advised patient to call [facility] for medication delivery if she has not already done so as she will need her meds prior to her baseline.   Doc Rosa 07/22/24 11:39 AM

## 2024-07-23 DIAGNOSIS — N97.9 FEMALE INFERTILITY: Primary | ICD-10-CM

## 2024-07-23 PROCEDURE — RXMED WILLOW AMBULATORY MEDICATION CHARGE

## 2024-07-23 NOTE — PROGRESS NOTES
Requested Prescriptions     Signed Prescriptions Disp Refills    follitropin josi (Gonal-f) 900/1.5 unit/mL pen injector pen injection 3 each 2     Sig: Inject 225 Units under the skin once daily in the evening. Inject under the skin as directed by provider       Sent in Gonal-F in place of Follistim RX per protocol to [facility]. Gonal-F is preferred FSH agent for self-pay as of 7/23/24.       Saira Shaw (Katie), PharmD  [facility]  Clinical Pharmacy Specialist- Fertility   [facility], Nandini Collado  [address] [place] [address]  Email: [email or web address]  Tel: [phone]       Fax: [phone]

## 2024-07-25 ENCOUNTER — TELEPHONE (OUTPATIENT)
Dept: ENDOCRINOLOGY | Facility: CLINIC | Age: 29
End: 2024-07-25
Payer: COMMERCIAL

## 2024-07-25 NOTE — TELEPHONE ENCOUNTER
Returned patient's call, spoke with her and her . They state she has been on her OCP for almost 2 weeks and need to not take that longer than 2 weeks per Dr. García. Reviewed with both that her BP should be cleared today and will then make a plan to bring her in for baseline at which time she will stop her OCP. D/W both that Dr García did not make any protocol notations of limiting the time on OCP and that patients can be on it up to 30 days. Advised that I anticipate her baseline for 7/31/24 and will contact them to make a plan after BP reviewed and cleared.   Questioned whether they have gotten their medications yet. They have not. Explained to them that they need to call today to arrange delivery as she must have her medications by the time of her baseline and that the meds were ordered on 7/15/24. State they have the number for [facility] and will call for delivery.  Deny additional questions.  Doc Rosa 07/25/24 3:23 PM

## 2024-07-25 NOTE — TELEPHONE ENCOUNTER
Reason for call:   Notes: Patient has some questions about the medications for her next cycle plan

## 2024-07-26 NOTE — PROGRESS NOTES
Telephone call to patient this morning advising her that her BP was reviewed and she is cleared to proceed with IVF #1. Instructed patient to continue her OCP and schedule her baseline appointment and nurse teach for 7/31/24. Patient states she still has not called [facility] for medication delivery. Again advised patient that she must have her medications by the time of her baseline and to call today to arrange delivery. Patient verbalizes understanding and denies additional questions at this time. Transferred to  to schedule appointment.  Doc Rosa 07/26/24 8:36 AM

## 2024-07-29 ENCOUNTER — PHARMACY VISIT (OUTPATIENT)
Dept: PHARMACY | Facility: CLINIC | Age: 29
End: 2024-07-29
Payer: COMMERCIAL

## 2024-07-31 ENCOUNTER — ANCILLARY PROCEDURE (OUTPATIENT)
Dept: ENDOCRINOLOGY | Facility: CLINIC | Age: 29
End: 2024-07-31

## 2024-07-31 ENCOUNTER — ANCILLARY PROCEDURE (OUTPATIENT)
Dept: ENDOCRINOLOGY | Facility: CLINIC | Age: 29
End: 2024-07-31
Payer: COMMERCIAL

## 2024-07-31 DIAGNOSIS — N97.9 FEMALE INFERTILITY: ICD-10-CM

## 2024-07-31 DIAGNOSIS — Z01.812 PRE-PROCEDURE LAB EXAM: ICD-10-CM

## 2024-07-31 LAB
ESTRADIOL SERPL-MCNC: <20 PG/ML
HCT VFR BLD AUTO: 38.3 % (ref 36–46)

## 2024-07-31 PROCEDURE — 36415 COLL VENOUS BLD VENIPUNCTURE: CPT

## 2024-07-31 PROCEDURE — 76857 US EXAM PELVIC LIMITED: CPT | Performed by: OBSTETRICS & GYNECOLOGY

## 2024-07-31 PROCEDURE — 82670 ASSAY OF TOTAL ESTRADIOL: CPT

## 2024-07-31 PROCEDURE — 76857 US EXAM PELVIC LIMITED: CPT

## 2024-07-31 PROCEDURE — 85014 HEMATOCRIT: CPT

## 2024-07-31 NOTE — PROGRESS NOTES
[facility]ARM[facility] NOTE - IVF STIM BASELINE  Patient presents for baseline ultrasound and/or labs.    Treatment protocol: ANTAGONIST  HMG 75  Lead in: OCP  Start date for lead in: 7/12/24  Last estrace/OCP date: 7/31/24  PGT-A/M? Yes; Req Sent: Yes; PGT-M Test Ready: No N/A; Company: [email or web address]  PGT order scanned into Epic, confirmed by: ODALIS on 6/18/24  Plan to freeze: embryos    [employer] forms/out waiver complete:  Yes    Does patient have medications onhand?  Yes  Pharmacy: [facility]    Boarding pass signed off:  Yes 7/25/24    Date of Female STDs: 4/18/24    Date of Male STDs: 4/18/24    Medically complex on boarding pass:   no    If indicated, is PAT consult complete?  N/A    SPERM:  partner  Fresh no Backup   Number of vials confirmed: 0 - DEFERRED FREEZE, OK per Dr. García    Additional details:   Doc Rosa  07/31/2024  8:27 AM    Sarah Quintero 07/31/24 12:20 PM    Reviewed by a provider in noon meeting. My Chart message sent to patient with noted plan.  Doc Rosa 07/31/24 12:50 PM

## 2024-07-31 NOTE — PROGRESS NOTES
Patient here today for RN teach of IVF stimulation medications. Reviewed Gonal F Injector Pen, Menopur, Ganirelix. Reviewed how to use injector pen. Patient has used Menopur in the past and states they know how to mix this medication. Reviewed preloaded Ganirelix syringe. Patient and spouse deny questions re: subcutaneous injection technique or location. Handouts given for video link and step by step instructions for all meds including both possible triggers. Patient verbalizes understanding and denies additional questions at this time.  Doc Rosa 07/31/24 8:37 AM

## 2024-08-02 ENCOUNTER — SPECIALTY PHARMACY (OUTPATIENT)
Dept: PHARMACY | Facility: CLINIC | Age: 29
End: 2024-08-02

## 2024-08-02 NOTE — PROGRESS NOTES
Patient declined pharmacist outreach- no further questions on medications.     Patient is being seen by the  Fertility Clinic and is undergoing treatment with IVF using the Antagonist Protocol.      Patient has the following medications from [facility]: Gonal-f 900 unit Redi-Ject pen, Menopur 75 unit vials, Ganirelix 250mcg syringes, Pregnyl 10,000IU vial for trigger , and Leuprolide 1mg/0.2mL kit for trigger     Medication receipt date: Delivery of medications set for 7/29/24  Reviewed storage of medications on phone today and that I will call her for a full medication education at baseline. [facility]  Fertility indication: IVF     Date of outreach: 8/2/24   Patient denied the pharmacist's offer of counseling.  Contact the Fertility Clinical Pharmacy Specialist or Support Liaison with any clinical or billing inquiries, as well as refill requests.       Treatment Start Date 8/4/24  Next refill date: Tentative based on monitoring  Reassessment date: IVF meetings      Saira Shaw (Katie), Uday  [facility]  Clinical Pharmacy Specialist- Fertility   [facility], [facility]  [address] [place] [address]  Email: [email or web address]  Tel: [phone]       Fax: [phone]

## 2024-08-07 ENCOUNTER — ANCILLARY PROCEDURE (OUTPATIENT)
Dept: ENDOCRINOLOGY | Facility: CLINIC | Age: 29
End: 2024-08-07

## 2024-08-07 DIAGNOSIS — Z01.83 ENCOUNTER FOR RH BLOOD TYPING: ICD-10-CM

## 2024-08-07 DIAGNOSIS — N97.9 FEMALE INFERTILITY: ICD-10-CM

## 2024-08-07 LAB
ABO GROUP (TYPE) IN BLOOD: NORMAL
ANTIBODY SCREEN: NORMAL
ESTRADIOL SERPL-MCNC: 181 PG/ML
RH FACTOR (ANTIGEN D): NORMAL

## 2024-08-07 PROCEDURE — 76857 US EXAM PELVIC LIMITED: CPT

## 2024-08-07 PROCEDURE — 86850 RBC ANTIBODY SCREEN: CPT

## 2024-08-07 PROCEDURE — 36415 COLL VENOUS BLD VENIPUNCTURE: CPT

## 2024-08-07 NOTE — PROGRESS NOTES
CYCLING NOTE    Here for US and/or lab monitoring for IVF #1; relevant findings reviewed.  Protocol: ANTAGONIST  HMG 75, today is day 4 of meds.   Patient stayed for nurse visit. Pain is 0/10  Team will contact patient later today with results and plan.    Doc Rosa  08/07/2024  7:52 AM    Reviewed by a provider in noon meeting. My Chart message sent to patient with noted plan.  Doc Rosa 08/07/24 1:30 PM

## 2024-08-09 ENCOUNTER — ANCILLARY PROCEDURE (OUTPATIENT)
Dept: ENDOCRINOLOGY | Facility: CLINIC | Age: 29
End: 2024-08-09
Payer: COMMERCIAL

## 2024-08-09 DIAGNOSIS — N97.9 FEMALE INFERTILITY: ICD-10-CM

## 2024-08-09 LAB — ESTRADIOL SERPL-MCNC: 491 PG/ML

## 2024-08-09 PROCEDURE — 36415 COLL VENOUS BLD VENIPUNCTURE: CPT

## 2024-08-09 PROCEDURE — 76857 US EXAM PELVIC LIMITED: CPT

## 2024-08-09 NOTE — PROGRESS NOTES
CYCLING NOTE    Here for US and/or lab monitoring for IVF #1; relevant findings reviewed.  Protocol: ANTAGONIST  HMG 75. Today is day 6 of medications.   Patient stayed for nurse visit. Pain is 0/10. Patient states she has enough medication for the weekend.   Team will contact patient later today with results and plan.    Doc Rosa  08/09/2024  7:33 AM    Reviewed by a provider in noon meeting. My Chart message sent to patient with noted plan.  Doc Rosa 08/09/24 1:07 PM

## 2024-08-11 ENCOUNTER — ANCILLARY PROCEDURE (OUTPATIENT)
Dept: ENDOCRINOLOGY | Facility: CLINIC | Age: 29
End: 2024-08-11

## 2024-08-11 DIAGNOSIS — N97.9 FEMALE INFERTILITY: ICD-10-CM

## 2024-08-11 LAB — ESTRADIOL SERPL-MCNC: 1010 PG/ML

## 2024-08-11 PROCEDURE — 36415 COLL VENOUS BLD VENIPUNCTURE: CPT

## 2024-08-11 PROCEDURE — 76857 US EXAM PELVIC LIMITED: CPT

## 2024-08-11 NOTE — PROGRESS NOTES
CYCLING NOTE    Here for US and/or lab monitoring; relevant findings reviewed. 28 year old patient of Dr. García with [facility].761 is here to monitor for IVF #1 after 7 days of injections.    Treatment protocol: ANTAGONIST  HMG 75 (has started her ganirelix)    Patient stayed for nurse visit. Pain is 0/10  Team will contact patient later today with results and plan.    ULISES SANABRIA  08/11/2024  10:08 AM    Patient called and notified of the plan in stim summary. Continue all meds and return Tuesday. Chart sent to  to reach out for scheduling - they will call today or tomorrow.   Maria García 08/11/24 11:32 AM

## 2024-08-13 ENCOUNTER — ANCILLARY PROCEDURE (OUTPATIENT)
Dept: ENDOCRINOLOGY | Facility: CLINIC | Age: 29
End: 2024-08-13

## 2024-08-13 DIAGNOSIS — N97.9 FEMALE INFERTILITY: ICD-10-CM

## 2024-08-13 LAB
ESTRADIOL SERPL-MCNC: 1867 PG/ML
PROGEST SERPL-MCNC: 1 NG/ML

## 2024-08-13 PROCEDURE — 76857 US EXAM PELVIC LIMITED: CPT

## 2024-08-13 PROCEDURE — 36415 COLL VENOUS BLD VENIPUNCTURE: CPT

## 2024-08-13 NOTE — PROGRESS NOTES
CYCLING NOTE    Here for US and/or lab monitoring for IVF #1; relevant findings reviewed.  Protocol: ANTAGONIST  HMG 75, day 10 of medications today.  Patient stayed for nurse visit. Pain is 0/10  Team will contact patient later today with results and plan.    Dco Rosa  08/13/2024  8:29 AM    Phone call to patient, reviewed plan as noted above, patient verbalizes understanding at this time and denies additional questions.   message sent with Pregnyl and Retrieval day instructions attached.   Doc Rosa 08/13/24 1:47 PM

## 2024-08-14 ENCOUNTER — ANESTHESIA EVENT (OUTPATIENT)
Dept: ENDOCRINOLOGY | Facility: CLINIC | Age: 29
End: 2024-08-14

## 2024-08-14 NOTE — ANESTHESIA PREPROCEDURE EVALUATION
"Patient: Delia Hernández    Procedure Information       Date/Time: 08/15/24 0830    Scheduled providers: James Wu MD    Procedure: EGG RETRIEVAL    [facility] [place]            Relevant Problems   No relevant active problems       Clinical information reviewed:   Tobacco  Allergies  Meds   Med Hx  Surg Hx   Fam Hx  Soc Hx         Past Medical History:   Diagnosis Date    History of miscarriage     PCOS (polycystic ovarian syndrome)       Past Surgical History:   Procedure Laterality Date    ANAL FISTULOTOMY  2023    DILATION AND CURETTAGE OF UTERUS      OTHER SURGICAL HISTORY  08/15/2024    Oocyte Retrieval     Social History     Tobacco Use    Smoking status: Never    Smokeless tobacco: Never   Substance Use Topics    Alcohol use: Not Currently    Drug use: Never      Current Outpatient Medications   Medication Instructions    levothyroxine (SYNTHROID, LEVOXYL) 50 mcg, oral, Daily    metFORMIN XR (GLUCOPHAGE-XR) 750 mg, oral, Daily with evening meal, Do not crush, chew, or split.<BR>Increase to 1500mg daily if able to tolerate higher dose.    metFORMIN XR (GLUCOPHAGE-XR) 1,000 mg, oral, Daily with evening meal, Do not crush, chew, or split.      No Known Allergies     Chemistry    No results found for: \"NA\", \"K\", \"CL\", \"CO2\", \"BUN\", \"CREATININE\", \"GLU\" No results found for: \"CALCIUM\", \"ALKPHOS\", \"AST\", \"ALT\", \"BILITOT\"       Lab Results   Component Value Date    HGBA1C 5.2 03/28/2023     Lab Results   Component Value Date/Time    WBC 6.2 07/10/2023 0808    HGB 12.8 07/10/2023 0808    HCT 38.3 07/31/2024 0716     07/10/2023 0808     No results found for: \"PROTIME\", \"PTT\", \"INR\"  No results found for: \"ABORH\"  No results found for this or any previous visit (from the past 4464 hour(s)).  No results found for this or any previous visit from the past 1095 days.       Visit Vitals  /71   Pulse 89   Temp 36.3 °C (97.3 °F) (Oral)   Resp 16 " "  Ht 1.676 m (5' 6\")   Wt 80 kg (176 lb 5.9 oz)   SpO2 98%   BMI 28.47 kg/m²   OB Status Having periods   Smoking Status Never   BSA 1.93 m²     NPO/Void Status  Date of Last Liquid: 08/14/24  Time of Last Liquid: 2200  Date of Last Solid: 08/14/24  Time of Last Solid: 2100         Physical Exam    Airway  Mallampati: II  TM distance: >3 FB  Neck ROM: full     Cardiovascular   Rhythm: regular  Rate: normal     Dental - normal exam     Pulmonary   Breath sounds clear to auscultation     Abdominal - normal exam              Anesthesia Plan    History of general anesthesia?: yes  History of complications of general anesthesia?: no    ASA 2     MAC   (With standard ASA monitoring.)  intravenous induction   Anesthetic plan and risks discussed with patient.    Plan discussed with CRNA and CAA.      "

## 2024-08-15 ENCOUNTER — ANESTHESIA (OUTPATIENT)
Dept: ENDOCRINOLOGY | Facility: CLINIC | Age: 29
End: 2024-08-15

## 2024-08-15 ENCOUNTER — HOSPITAL ENCOUNTER (OUTPATIENT)
Dept: ENDOCRINOLOGY | Facility: CLINIC | Age: 29
Discharge: HOME | End: 2024-08-15

## 2024-08-15 VITALS
HEIGHT: 66 IN | OXYGEN SATURATION: 100 % | TEMPERATURE: 97.5 F | BODY MASS INDEX: 28.34 KG/M2 | RESPIRATION RATE: 18 BRPM | SYSTOLIC BLOOD PRESSURE: 111 MMHG | WEIGHT: 176.37 LBS | HEART RATE: 61 BPM | DIASTOLIC BLOOD PRESSURE: 76 MMHG

## 2024-08-15 DIAGNOSIS — Z31.83 ENCOUNTER FOR ASSISTED REPRODUCTIVE FERTILITY CYCLE: ICD-10-CM

## 2024-08-15 LAB — PREGNANCY TEST URINE, POC: POSITIVE

## 2024-08-15 PROCEDURE — 89250 CULTR OOCYTE/EMBRYO <4 DAYS: CPT | Performed by: OBSTETRICS & GYNECOLOGY

## 2024-08-15 PROCEDURE — 2500000004 HC RX 250 GENERAL PHARMACY W/ HCPCS (ALT 636 FOR OP/ED): Performed by: OBSTETRICS & GYNECOLOGY

## 2024-08-15 PROCEDURE — 3700000001 HC GENERAL ANESTHESIA TIME - INITIAL BASE CHARGE

## 2024-08-15 PROCEDURE — 7100000010 HC PHASE TWO TIME - EACH INCREMENTAL 1 MINUTE

## 2024-08-15 PROCEDURE — 7100000009 HC PHASE TWO TIME - INITIAL BASE CHARGE

## 2024-08-15 PROCEDURE — 89280 ASSIST OOCYTE FERTILIZATION: CPT | Performed by: OBSTETRICS & GYNECOLOGY

## 2024-08-15 PROCEDURE — 89272 EXTENDED CULTURE OF OOCYTES: CPT | Performed by: OBSTETRICS & GYNECOLOGY

## 2024-08-15 PROCEDURE — 89291 BIOPSY OOCYTE POLAR BODY: CPT | Performed by: OBSTETRICS & GYNECOLOGY

## 2024-08-15 PROCEDURE — 2500000004 HC RX 250 GENERAL PHARMACY W/ HCPCS (ALT 636 FOR OP/ED): Performed by: ANESTHESIOLOGIST ASSISTANT

## 2024-08-15 PROCEDURE — 58970 RETRIEVAL OF OOCYTE: CPT | Performed by: OBSTETRICS & GYNECOLOGY

## 2024-08-15 PROCEDURE — 81025 URINE PREGNANCY TEST: CPT | Performed by: OBSTETRICS & GYNECOLOGY

## 2024-08-15 PROCEDURE — 89258 CRYOPRESERVATION EMBRYO(S): CPT | Performed by: OBSTETRICS & GYNECOLOGY

## 2024-08-15 PROCEDURE — 3700000002 HC GENERAL ANESTHESIA TIME - EACH INCREMENTAL 1 MINUTE

## 2024-08-15 PROCEDURE — 89261 SPERM ISOLATION COMPLEX: CPT | Performed by: OBSTETRICS & GYNECOLOGY

## 2024-08-15 PROCEDURE — 96372 THER/PROPH/DIAG INJ SC/IM: CPT | Performed by: OBSTETRICS & GYNECOLOGY

## 2024-08-15 RX ORDER — MIDAZOLAM HYDROCHLORIDE 1 MG/ML
INJECTION, SOLUTION INTRAMUSCULAR; INTRAVENOUS AS NEEDED
Status: DISCONTINUED | OUTPATIENT
Start: 2024-08-15 | End: 2024-08-15

## 2024-08-15 RX ORDER — GLYCOPYRROLATE 0.2 MG/ML
INJECTION INTRAMUSCULAR; INTRAVENOUS AS NEEDED
Status: DISCONTINUED | OUTPATIENT
Start: 2024-08-15 | End: 2024-08-15

## 2024-08-15 RX ORDER — FENTANYL CITRATE 50 UG/ML
INJECTION, SOLUTION INTRAMUSCULAR; INTRAVENOUS AS NEEDED
Status: DISCONTINUED | OUTPATIENT
Start: 2024-08-15 | End: 2024-08-15

## 2024-08-15 RX ORDER — PROPOFOL 10 MG/ML
INJECTION, EMULSION INTRAVENOUS CONTINUOUS PRN
Status: DISCONTINUED | OUTPATIENT
Start: 2024-08-15 | End: 2024-08-15

## 2024-08-15 RX ORDER — ONDANSETRON HYDROCHLORIDE 2 MG/ML
INJECTION, SOLUTION INTRAVENOUS AS NEEDED
Status: DISCONTINUED | OUTPATIENT
Start: 2024-08-15 | End: 2024-08-15

## 2024-08-15 RX ORDER — CEFAZOLIN 1 G/1
INJECTION, POWDER, FOR SOLUTION INTRAVENOUS AS NEEDED
Status: DISCONTINUED | OUTPATIENT
Start: 2024-08-15 | End: 2024-08-15

## 2024-08-15 RX ORDER — KETOROLAC TROMETHAMINE 30 MG/ML
30 INJECTION, SOLUTION INTRAMUSCULAR; INTRAVENOUS ONCE AS NEEDED
Status: DISCONTINUED | OUTPATIENT
Start: 2024-08-15 | End: 2024-08-16 | Stop reason: HOSPADM

## 2024-08-15 RX ORDER — LIDOCAINE HYDROCHLORIDE 10 MG/ML
10 INJECTION, SOLUTION EPIDURAL; INFILTRATION; INTRACAUDAL; PERINEURAL ONCE
Status: DISCONTINUED | OUTPATIENT
Start: 2024-08-15 | End: 2024-08-16 | Stop reason: HOSPADM

## 2024-08-15 RX ORDER — ACETAMINOPHEN 325 MG/1
650 TABLET ORAL ONCE AS NEEDED
Status: COMPLETED | OUTPATIENT
Start: 2024-08-15 | End: 2024-08-15

## 2024-08-15 ASSESSMENT — COLUMBIA-SUICIDE SEVERITY RATING SCALE - C-SSRS
6. HAVE YOU EVER DONE ANYTHING, STARTED TO DO ANYTHING, OR PREPARED TO DO ANYTHING TO END YOUR LIFE?: NO
2. HAVE YOU ACTUALLY HAD ANY THOUGHTS OF KILLING YOURSELF?: NO
1. IN THE PAST MONTH, HAVE YOU WISHED YOU WERE DEAD OR WISHED YOU COULD GO TO SLEEP AND NOT WAKE UP?: NO

## 2024-08-15 ASSESSMENT — PAIN - FUNCTIONAL ASSESSMENT
PAIN_FUNCTIONAL_ASSESSMENT: 0-10
PAIN_FUNCTIONAL_ASSESSMENT: 0-10

## 2024-08-15 ASSESSMENT — PAIN SCALES - WONG BAKER: WONGBAKER_NUMERICALRESPONSE: HURTS LITTLE MORE

## 2024-08-15 ASSESSMENT — PAIN DESCRIPTION - LOCATION: LOCATION: ABDOMEN

## 2024-08-15 ASSESSMENT — PAIN SCALES - GENERAL
PAINLEVEL_OUTOF10: 5 - MODERATE PAIN
PAINLEVEL_OUTOF10: 0 - NO PAIN
PAINLEVEL_OUTOF10: 2
PAINLEVEL_OUTOF10: 0 - NO PAIN
PAINLEVEL_OUTOF10: 5 - MODERATE PAIN

## 2024-08-15 ASSESSMENT — PAIN SCALES - PAIN ASSESSMENT IN ADVANCED DEMENTIA (PAINAD): TOTALSCORE: WARM PACK

## 2024-08-15 NOTE — PROGRESS NOTES
Patient ID: Delia Hernández is a 28 y.o. female.    Egg Retrieval    Date/Time: 8/15/2024 9:25 AM    Performed by: James Wu MD  Authorized by: SHEELA Jane    Consent:     Consent obtained:  Verbal and written    Consent given by:  Patient    Procedure risks and benefits discussed: yes      Patient questions answered: yes      Patient agrees, verbalizes understanding, and wants to proceed: yes      Educational handouts given: yes      Instructions and paperwork completed: yes    Procedure:     Anesthesia:  MAC    Pelvic exam performed: Yes      Cervix cleaned and prepped: Yes      Speculum placed in vagina: Yes      Tenaculum applied to cervix: No      Ultrasound guidance: Yes      Left ovary:  Follicle present    Right ovary:  Follicle present    Pt. post-ovulatory: No      Speculum type: Soha      Retrieval method: transvaginal      Needle inserted: Yes      Ovaries aspirated: Yes      Free fluid in pelvis: No    Post-procedure:     Patient tolerated procedure well: yes    Comments:      Preop diagnosis: Female infertility   Post op diagnosis: Same  Assistant: none    IV Fluids: 800  cc  EBL: 5  cc  UOP: Not recorded    Specimen: Oocytes  Complications: None    Number of Oocytes right ovary: 7   Ovarian accèss (right): Easy  Number of Oocytes left ovary: 9  Ovarian access (left): Easy  Endometrial thickness: n/a  Needle type: Double  Additional notes:

## 2024-08-15 NOTE — ANESTHESIA POSTPROCEDURE EVALUATION
Patient: Delia Hernández    Procedure Summary       Date: 08/15/24 [facility]    Anesthesia Start: 0835 Anesthesia Stop: 0933    Procedure: EGG RETRIEVAL Diagnosis: Encounter for assisted reproductive fertility cycle    Scheduled Providers: James Wu MD; Martin Bello MD; RODOLFO Goldberg Responsible Provider: Martin Bello MD    Anesthesia Type: MAC ASA Status: 2            Anesthesia Type: MAC    Vitals Value Taken Time   /76 08/15/24 1033   Temp 36.4 °C (97.5 °F) 08/15/24 1003   Pulse 61 08/15/24 1033   Resp 18 08/15/24 1033   SpO2 100 % 08/15/24 1033       Anesthesia Post Evaluation    Patient location during evaluation: PACU  Patient participation: complete - patient participated  Level of consciousness: awake and alert  Pain management: adequate  Airway patency: patent  Cardiovascular status: acceptable and hemodynamically stable  Respiratory status: acceptable, spontaneous ventilation and nonlabored ventilation  Hydration status: acceptable  Postoperative Nausea and Vomiting: none        There were no known notable events for this encounter.

## 2024-08-15 NOTE — NURSING NOTE
Patient up bathroom, with partner's assistance.  Complains of mild cramping, able to void without issue, reports small amount of bleeding

## 2024-08-15 NOTE — DISCHARGE INSTRUCTIONS
[facility]  [address]. [place]  [address]   Tel: [phone]   Fax: [phone]    Home Going Instructions after Egg Retrieval:     Activity:   We do not recommend exercise on the day of or the day after your egg retrieval.   You can resume normal activities in 2-3 days, but please avoid exercise that involves jumping or bouncing.  If you are not having a fresh embryo transfer, you will likely start your period within 1-2 weeks and can resume all normal exercise at that time.   You may resume intercourse one week after your retrieval.     Anesthesia:  Drink small amounts of liquids initially and then slowly increase your intake of food on the day of your procedure. Drinking fluids will keep your bowels regular.   Avoid foods that are sweet, spicy or hard to digest today.  If you feel nauseated, rest your stomach for one hour, and then try drinking clear liquids again.  You may take a stool softener or miralax/milk of magnesia to help with constipation that may occur after anesthesia.  Please make sure a responsible adult is with you for at least 24 hours after surgery and do not drive or make important decisions during this time. Anesthesia may affect your judgment, coordination, and reaction time.    Follow up:  ALWAYS follow up with your primary nurse a few days after your procedure to check in and make sure you know what your next steps are.     When to call your provider:  Vaginal bleeding that is more than a normal period that doesn't taper down within 6 hours.  Saturating a sanitary pad in 1-2 hours.  Any clots the size of an egg or bigger.  Fever of 100.4 or higher with chills.  Unusual or foul smelling discharge.  Discomfort from abdominal distension.     Notify your Physician's office if you develop any signs of Ovarian Hyperstimulation (OHSS):    -Abdominal bloating   -Rapid weight gain of 5-10 lbs. in 1-2 days  -Swelling in  hands and feet  -Severe abdominal pain  -Shortness of breath   -Difficulty urinating or decreased urinary output   -Diarrhea    -Persistent nausea and vomiting  -Dizziness     These signs and symptoms can occur for up to 2 weeks following your oocyte retrieval. Call [phone] to speak with a Physician or Nurse if you have any concerns.          [facility]  [address]. [place], [place]     IVF LAB EMBRYO UPDATE PROTOCOL    The IVF Lab will call on the following days:    Retrieval Day: The doctor performing the procedure will tell you the total number of eggs collected. There will be no update from the IVF Lab on retrieval day.   Day 1: The IVF Lab will call you between 9:00-11:00 with an egg fertilization update.    Day 6: The IVF Lab will call you for an update on how your embryos have developed and how many have reached the blastocyst stage.    Day 6/7: You will receive an email from the IVF Lab with your summary report indicating the number of embryos that were able to be frozen. If you are doing genetic testing on your embryos you will receive a phone call regarding the number of embryos biopsied and frozen.        [facility]  [address]. [place]  [address]  Tel: [phone]   Fax: [phone]    Frozen Embryo Transfer Instructions    After your egg retrieval, follow up with your IVF nurse within 3-4 days Monday-Friday regarding the plan for your frozen embryo transfer (FET) cycle. Your IVF nurse will order and review with you the medications you will be using for the cycle.     You will be sent an email from [employer] to fill out your Frozen Embryo Treatment Plan at this time. This must be completed by the day you call the office with your period to set up the transfer cycle. If you do not have this paperwork completed when you call for cycle set up, you may need to take a  birth control pill, if appropriate, or wait until your following period. Completion of this paperwork is what allows us to call your embryos back from offsite storage for embryo transfer.    Please call the office on the first full flow day of your period to speak with your IVF nurse.    If the first day of your cycle begins over the weekend, please call the office Monday morning.   Depending on our embryo transfer schedule and the medication protocol your doctor prescribes, you may not start your embryo transfer cycle medications immediately after your period starts. If appropriate for you, your doctor may have you start birth control to help time your embryo transfer cycle which means there may be a short delay in starting your FET cycle.   If you did PGT testing of your embryos, you will not start a frozen embryo transfer cycle until we have received your results. It can take up to a few weeks to receive these results. If appropriate, we may have you take birth control from the time your period starts until the results are ready.      You will be set up for a lining check ultrasound and labs mid cycle, and given a tentative embryo transfer date. You may require multiple lining checks at the discretion of your provider. After your provider determines your lining is adequate, we will determine what date you will begin your progesterone support and confirm the day of your embryo transfer.     The embryology lab will contact you the day before with the time of your embryo transfer and when to arrive.     Please make sure to drink 20oz. of water one hour before your scheduled arrival time.   You do not need to be on bedrest following your embryo transfer. You may resume normal activity following embryo transfer.   You will have your blood drawn on the day of transfer to check a progesterone level and you will receive a call that afternoon with your results.   Do not discontinue any of your medications unless  instructed to by your provider.     Vilma Barrera RN    7:55 AM

## 2024-08-15 NOTE — NURSING NOTE
Patient discharged to home in stable condition via wheelchair accompanied by this RN to [facility] HOME: Partner's car. Discharge instructions given and concerns addressed. Patient verbalized understanding of all information provided and is agreeable.

## 2024-08-21 ENCOUNTER — SPECIALTY PHARMACY (OUTPATIENT)
Dept: PHARMACY | Facility: CLINIC | Age: 29
End: 2024-08-21

## 2024-08-21 NOTE — PROGRESS NOTES
[facility] SPECIALTY PHARMACY PATIENT REASSESSMENT AND END OF THERAPY NOTE- FERTILITY    [facility] Supplied Medications:    Gonal-f 900 unit Redi-Ject pen, Menopur 75 unit vials, Ganirelix 250mcg syringes, Pregnyl 10,000IU vial for trigger , and Leuprolide 1mg/0.2mL kit for trigger    Treatment Start Date: 8/4/24  Treatment End Date: 8/15/24      IVF Meeting/Reassessment Tracking:     First Fill Assessment Completed: pt declined   New IVF Cycle- cycle 1, antagonist protocol, start medications 8/4/24     IVF Baseline Starting Doses:   Gonal-F 225 units subcutaneous daily  Menopur 75 subcutaneous daily  Monitoring again 8/7/24     IVF Meeting 8/7/24:  Increase Gonal-F to 275 units subcutaneous daily  Continue Menopur 75 subcutaneous daily  Monitoring again 8/9/24     IVF Meeting 8/9/24:  Continue Gonal-F 275 units subcutaneous daily  Continue Menopur 75 subcutaneous daily  Start Ganirelix 250mcg subcutaneous on 8/10  Monitoring again 8/11/24     IVF Meeting 8/11/24:  Continue Gonal-F 275 units subcutaneous daily  Continue Menopur 75 subcutaneous daily  Continue Ganirelix 250mcg subcutaneous daily  Monitoring again 8/13/24     IVF Meeting 8/13/24:  Trigger tonight with HCG 10,000 units for egg retrieval 8/15/24    TOLERANCE:   Have you experienced any side effects from this medication? No  Are there any changes to current therapy regimen? Yes - increase in FSH dose during cycle    EFFICACY:   Have you developed any new symptoms of your condition? No  How do you feel your medication is affecting your disease state? 16 oocytes retrieved    GOALS:  Your goals of therapy are: Ultimate goal of maintaining a viable pregnancy and delivery of a healthy baby    COMPLIANCE / ADHERENCE:  Have you had any unplanned missed doses? No  If yes, how often do you miss doses and is there a particular contributing reason? N/a    Barriers to adherence:  Patient has a difficult time remembering to take medications? No  Difficult  administration technique? No  Medication cost? No  Patient does not think medication is beneficial? No  Other? No  Actions taken to address barriers: N/A    GENERAL ASSESSMENT:  Are there any changes to your home medications, OTCs or supplements? No  Do you have any new allergies? No  Have you been diagnosed with any new medical conditions? No    PATIENT MANAGEMENT:  Do you have any remaining fertility medications? Yes - reviewed proper storage of extra medications  Do you have any questions regarding your medications, or care? No  Do you have any concerns with access to your medications? No  How would you classify your Quality of Life on a scale of 1-10? Feels good, waiting on results  How would you describe your satisfaction with  Specialty Pharmacy services on a scale of 1-10? 10    IMPRESSION/PLAN:  Is patient high risk (potential patients: patient referred to maternal fetal medicine)? No  Is laboratory follow-up needed? No  Is a clinical intervention needed? No  Additional comments: Patient has direct contact for any further fertility medication needs      Saira Shaw (Katie), Uday  [facility]  Clinical Pharmacy Specialist- Fertility   [facility] [place]  [address] [place] [address]  Email: [email or web address]  Tel: [phone]       Fax: [phone]

## 2024-08-23 NOTE — PROGRESS NOTES
Telephone call to patient per her  request. Desires to discuss next steps to proceed with FET. Patient states she has 8 embryos that were sent for biopsy. D/W patient we will need to wait for those results to know how many normal embryos and for them to choose which one to transfer. Reviewed need to start FET BP, patient to sign ET treatment plan but needs to wait until PGTA results back as she will choose which specific embryo will be transferred, need to be cleared before proceed. Advised patient this RN will place orders for ET and order protocol meds to [facility]. Patient advised to call office when she has received results from her provider. Discussed use of OCP with upcoming cycle while waiting for PGTA results. Patient does not want to go on OCP and will wait for menses in September to proceed with FET. Patient verbalizes understanding and denies additional questions at this time.  Order placed and meds sent to [facility]. Added to start calendar.  Doc Rosa 08/23/24 4:29 PM

## 2024-08-28 DIAGNOSIS — N97.9 FEMALE INFERTILITY: ICD-10-CM

## 2024-08-28 DIAGNOSIS — E03.8 SUBCLINICAL HYPOTHYROIDISM: ICD-10-CM

## 2024-08-28 RX ORDER — ASPIRIN 81 MG/1
81 TABLET ORAL DAILY
Qty: 30 TABLET | Refills: 2 | Status: SHIPPED | OUTPATIENT
Start: 2024-08-28 | End: 2025-08-28

## 2024-08-28 RX ORDER — DOXYCYCLINE 100 MG/1
100 CAPSULE ORAL 2 TIMES DAILY
Qty: 8 CAPSULE | Refills: 0 | Status: SHIPPED | OUTPATIENT
Start: 2024-08-28 | End: 2025-08-28

## 2024-08-28 RX ORDER — ESTRADIOL 2 MG/1
6 TABLET ORAL DAILY
Qty: 90 TABLET | Refills: 2 | Status: SHIPPED | OUTPATIENT
Start: 2024-08-28 | End: 2025-08-28

## 2024-08-28 RX ORDER — METFORMIN HYDROCHLORIDE 500 MG/1
1000 TABLET, EXTENDED RELEASE ORAL
Qty: 60 TABLET | Refills: 0 | Status: SHIPPED | OUTPATIENT
Start: 2024-08-28 | End: 2024-09-27

## 2024-08-28 RX ORDER — LEVOTHYROXINE SODIUM 50 UG/1
100 TABLET ORAL DAILY
Qty: 60 TABLET | Refills: 11 | Status: SHIPPED | OUTPATIENT
Start: 2024-08-28 | End: 2025-08-28

## 2024-09-16 ENCOUNTER — TELEPHONE (OUTPATIENT)
Dept: ENDOCRINOLOGY | Facility: CLINIC | Age: 29
End: 2024-09-16
Payer: COMMERCIAL

## 2024-09-16 DIAGNOSIS — Z31.83 ENCOUNTER FOR ASSISTED REPRODUCTIVE FERTILITY CYCLE: ICD-10-CM

## 2024-09-16 DIAGNOSIS — N97.9 FEMALE INFERTILITY: ICD-10-CM

## 2024-09-16 NOTE — TELEPHONE ENCOUNTER
Returned patient's call, advised that Dr. García will call or [facility] message her with the PGT results. Reminded patient that as soon as those results are back she needs to complete her FET consents and note which embryo she is transferring. Patient aware that is only item on BP that needs completed to be cleared. Reviewed again with patient that she will start Estrace 6mg on 1st day of full flow and call office with menses start for FET set up dates. Patient verbalizes understanding and denies additional questions at this time.  Doc Rosa 09/16/24 11:03 AM

## 2024-09-16 NOTE — TELEPHONE ENCOUNTER
Reason for call: Call Back  Notes: Pt anticipates starting her cycle within the next 10 days and would like to verify her medication plan and discuss her next steps. Pt also stated she has not heard back about her genetic results yet.

## 2024-09-16 NOTE — PROGRESS NOTES
Boarding Pass Interval FET Checklist    Age: 29 y.o.    Provider: Maria García MD  Primary RN:   Reasons for Treatment: Unexplained Infertility, Recurrent Pregnancy Loss, and Polycystic Ovarian Syndrome  Last BMI  08/15/24 : 28.47 kg/m²       Past Medical History:   Diagnosis Date    History of miscarriage     PCOS (polycystic ovarian syndrome)        Date Done Consultation Results/Comments   6/15/24 Medication Protocol FET:  Protocol: Programmed  Adjuncts:  ASA 81 mg, Doxy, and Prednisone  OCP candidate: Yes  Notes: CONTINUE METFORMIN   10/7/24 FET Treatment Plan Packet- ID REQ (Every cycle) Received and in chart: Yes (Lyn Shin RN)   2024 IVF Information and Authorization - [employer]/offsite Storage (ID REQ) (Yearly) Received and in chart: Yes (Lyn Shin RN)   2024 [employer] Embryo- Couple or Single Packet (Yearly) Received and in chart: Yes (Lyn Shin RN)   10/7/24 [facility] Waiver (In) Form Received and in chart: Yes (Lyn Shin RN)   10/7/24 Embryo Ship In 1 Embryo ship in;    ID#   Embryo #1 [facility]      2024 Procedure Order Placed [x]    MFM Consult Okay to proceed? N/A    Psych Consult Okay to proceed? N/A    Genetics Consult Okay to proceed? N/A    Other    Date Done Female Labs Results/Comments   2024 T&S (Q 1 Year) ABO: O  Rh: POS  Antibody: NEG   2024 Hep B sAg Nonreactive   2024 Hep C AB Nonreactive   2024 HIV Nonreactive   2024 Syphilis Nonreactive   2024 GC/CT GC: Negative  CT: Negative   3/28/2023 Rubella (Q 5 Years) POSITIVE   3/28/2023 Varicella (Q 5 Years) POSITIVE   2023 TSH 2.53   3/8/2023 HgbA1C 5.2    Uterine Cavity Eval HSG:  3/28/23:  Uterus - normal contour without filling defects   Tubes- bilateral patency with normal architecture    SIS:     Hyster: (2024) Procedure: Diagnostic Hysteroscopy   Preop diagnosis: fertility testing  Post op diagnosis: Same   Assistant: Dr. Doyle    Anesthesia:  None   IV: None   EBL: 3 cc  Specimens: None   Complications: None   Risks benefits and alternatives of the procedure explained to the patient and informed consent was obtained. Urine pregnancy test was performed and was negative. Time out was performed. The patient was placed in the dorsal lithotomy position and a sterile speculum was placed in the vagina. The cervix was sterilized with Betadine x3. Paracervical block with lidocaine 1% was administered.   Tenaculum: Yes  Dilation: No  The hysteroscope was placed in the cervix and advanced into the uterine cavity. Normal saline was used for distension media. Images were obtained and findings noted as below.   All instruments were then removed. Good hemostasis was noted. Patient tolerated the procedure well returned to the recovery area in stable condition.   Findings:   Cavity: Smooth cavity no lesions noted  Ostia: Bilateral tubal ostia visualized  Additional Notes:    Attending Attestation: I was physically present for key and critical portions performed by the fellow. I reviewed the fellow's documentation and discussed the patient with the fellow. I agree with the fellow's medical decision making as documented in the fellow's note.   James Wu 06/21/24 11:19 AM                 9/28/2021 Pap Smear NEG ([facility] result)   N/A Mammogram ( > 40)           Date Done Miscellaneous Results/Comments   N/A BMI Checklist  BMI > 40 or < 18    N/A >= 45 Checklist     **Does not need to be completed prior to placing on IVF calendar**    MD Completion:  Ectopic Risk: No  Medically Complex: No    BOARDING PASS REVIEW NOTE  Reviewed boarding pass with the nurse and patient is able to move forward with the planned treatment cycle.   Nurse: DAILY Ricci MD  Reproductive Endocrinology and Infertility  [facility]

## 2024-09-24 ENCOUNTER — TELEPHONE (OUTPATIENT)
Dept: ENDOCRINOLOGY | Facility: CLINIC | Age: 29
End: 2024-09-24
Payer: COMMERCIAL

## 2024-09-24 NOTE — TELEPHONE ENCOUNTER
Telephone call forwarded to Dr. García, as she is primary MD as well as DOD tomorrow 9/25, to make her aware that patient is awaiting results. Donya sent to patient that although RN's cannot disclose or interpret PGT results, that this RN made Dr. García aware.    09/24/24 at 4:07 PM - Yazmin Camargo RN

## 2024-09-24 NOTE — TELEPHONE ENCOUNTER
Reason for call: Call Back  Notes: Pt calling to follow up on genetic testing for her embryos. She has not yet received any information and wanted to check in with the office.

## 2024-09-25 ENCOUNTER — PATIENT MESSAGE (OUTPATIENT)
Dept: ENDOCRINOLOGY | Facility: CLINIC | Age: 29
End: 2024-09-25
Payer: COMMERCIAL

## 2024-09-25 ENCOUNTER — TELEPHONE (OUTPATIENT)
Dept: ENDOCRINOLOGY | Facility: CLINIC | Age: 29
End: 2024-09-25
Payer: COMMERCIAL

## 2024-09-25 NOTE — TELEPHONE ENCOUNTER
Reason for call: Patient is calling to talk to a nurse about her medication. Please call her.  Notes:

## 2024-09-25 NOTE — TELEPHONE ENCOUNTER
Returned patient call regarding FET medications and PGT results. Patient instructed this RN sent a message to Dr. García to call patient to review results/plan. Patient instructed this RN will meet with Dr. García later this afternoon to ensure she sees the message. Patient instructed if next steps are for her to move forward with a transfer that she should complete engaged MD FET packet after speaking with Dr. García and call [facility] for prednisone and RAD with supplies. Patient confirmed she has estrace, doxy and baby ASA. Patient instructed if she is moving forward with FET she would start estrace 6 mg with first day of full flow and call office. Patient agreeable and denies further questions/concerns.   CINTHYA HYLTON on 9/25/24 at 3:11 PM.

## 2024-09-30 ENCOUNTER — DOCUMENTATION (OUTPATIENT)
Dept: ENDOCRINOLOGY | Facility: CLINIC | Age: 29
End: 2024-09-30
Payer: COMMERCIAL

## 2024-09-30 NOTE — PROGRESS NOTES
Called to review her FET in light of history of SAB.   She is taking metformin 500mg BID and had GI issues at the higher doses. Will try increasing again.   Expects period any day and has estrace to start with this menses.     Will ask Rns to communicate the following protocol:  Protocol: Programmed  Adjuncts:  ASA 81 mg, Doxy, and Prednisone  OCP candidate: Yes  Notes: CONTINUE METFORMIN- reviewed increasing to 1500mg daily    They do not want to know gender. Will ask lab to select optimal embryo from her two euploid.   Maria García 09/30/24 11:31 AM

## 2024-10-07 ENCOUNTER — TELEPHONE (OUTPATIENT)
Dept: ENDOCRINOLOGY | Facility: CLINIC | Age: 29
End: 2024-10-07
Payer: COMMERCIAL

## 2024-10-07 RX ORDER — METFORMIN HYDROCHLORIDE 500 MG/1
1000 TABLET, EXTENDED RELEASE ORAL
Qty: 180 TABLET | Refills: 0 | Status: SHIPPED | OUTPATIENT
Start: 2024-10-07 | End: 2025-01-05

## 2024-10-07 NOTE — TELEPHONE ENCOUNTER
Reason for call: reporting start of cycle  LMP: 10/6  Treatment type: FET  Note: Pt started her meds yesterday and would like a call for next steps. She stated she's out of Metformin. She would like script sent to [facility] in [place] on [address]. She requests the script be written for 90 day supply for insurance purposes.

## 2024-10-07 NOTE — TELEPHONE ENCOUNTER
Patient called with menses for FET setup.   LMP: 10/6/24- confirmed patient started estrace 6 mg with menses   Protocol: Protocol: Programmed  Adjuncts:  ASA 81 mg, Doxy, and Prednisone  OCP candidate: Yes  Notes: CONTINUE METFORMIN- reviewed increasing to 1500mg daily  Tentative lining check: 10/21/24 (CD 16)  Tentative progesterone start: 10/24/24 (CD 19)  Tentative transfer date: 10/29/24 (CD 24) with Dr. García   Number of days of progesterone for transfer: 6  Treatment plan confirmed: needs to sign   In waiver confirmed: needs to sign   Embryo # confirmed: TBD   Embryo # to transfer: 1  Cinthya Shin  10/07/2024  11:45 AM     Returned patient call regarding LMP- 10/6/24. Patient states she started estrace 6 mg with menses per Dr. García. Patient instructed in order to be cleared for treatment and have FET plan brought to meeting she must complete FET packet tonight. Patient instructed email will first be sent to her for completion and then to her partner. Patient instructed to continue estrace 6 mg until instructed otherwise. Above dates as well as prednisone, doxy and baby asa start dates reviewed with patient. Patient instructed this RN will take FET plan to meeting tomorrow for review and have BP reviewed by provider. Patient added to IVF start calendar. Patient added to huddle for tomorrow for FET set up. Patient agreeable and denies further questions/concerns.   CINTHYA SHIN on 10/7/24 at 11:56 AM.

## 2024-10-08 NOTE — PROGRESS NOTES
Patient called with menses for FET setup.   LMP: 10/6/24- confirmed patient started estrace 6 mg with menses   Protocol: Protocol: Programmed  Adjuncts:  ASA 81 mg, Doxy, and Prednisone  OCP candidate: Yes  Notes: CONTINUE METFORMIN- reviewed increasing to 1500mg daily  Tentative lining check: 10/21/24 (CD 16)  Tentative progesterone start: 10/24/24 (CD 19)  Tentative transfer date: 10/29/24 (CD 24) with Dr. García   Number of days of progesterone for transfer: 6  Treatment plan confirmed: signed 10/7/24  In waiver confirmed: signed 10/7/24  BP needs reviewed!   Embryo # confirmed: 2 euploid embryos at reprotech on 10/8/24 with JF  Embryo # to transfer: 1  Cinthya Shin  10/08/2024  9:10 AM     Dasha Carrasco 10/08/24 1:14 PM    My chart message sent to patient with above plan. BP routed to Dr. Wu for review.   CINTHYA SHIN on 10/8/24 at 2:28 PM.

## 2024-10-09 PROCEDURE — RXMED WILLOW AMBULATORY MEDICATION CHARGE

## 2024-10-18 ENCOUNTER — SPECIALTY PHARMACY (OUTPATIENT)
Dept: PHARMACY | Facility: CLINIC | Age: 29
End: 2024-10-18

## 2024-10-21 ENCOUNTER — ANCILLARY PROCEDURE (OUTPATIENT)
Dept: ENDOCRINOLOGY | Facility: CLINIC | Age: 29
End: 2024-10-21
Payer: COMMERCIAL

## 2024-10-21 ENCOUNTER — APPOINTMENT (OUTPATIENT)
Dept: ENDOCRINOLOGY | Facility: CLINIC | Age: 29
End: 2024-10-21
Payer: COMMERCIAL

## 2024-10-21 DIAGNOSIS — N97.9 FEMALE INFERTILITY: ICD-10-CM

## 2024-10-21 LAB
ESTRADIOL SERPL-MCNC: 195 PG/ML
PROGEST SERPL-MCNC: 1 NG/ML

## 2024-10-21 PROCEDURE — 76857 US EXAM PELVIC LIMITED: CPT | Performed by: OBSTETRICS & GYNECOLOGY

## 2024-10-21 PROCEDURE — 84144 ASSAY OF PROGESTERONE: CPT

## 2024-10-21 PROCEDURE — 82670 ASSAY OF TOTAL ESTRADIOL: CPT

## 2024-10-21 PROCEDURE — 76857 US EXAM PELVIC LIMITED: CPT

## 2024-10-21 NOTE — PROGRESS NOTES
CYCLING NOTE - LINING CHECK    Here for US and/or lab monitoring; relevant findings reviewed.    29yr old patient of Dr. García - FET #1. Here today for lining check; programmed FET + ASA 81 mg, Doxy, and Prednisone. Planning to continue Metformin (1500 mg). Tentative RAD start 10/24. Tentative transfer 10/29 with Dr. García.     Patient stayed for nurse visit. Pain is 0/10. This RN reviewed timing of the following medications:    - Prednisone to start and end with RAD  - Doxy to start day before transfer and continue for 4 days; BID  - ASA to start day of transfer    RAD education and instructions provided to patient. Medication QR code given and reviewed with patient. Bilateral gluteal injection sites marked and patient provided skin marker.   Team will contact patient later today with results and plan.    Yazmin Camargo  10/21/2024  7:38 AM    Telephone call made to patient with MD plan. Patient aware to have P4 level drawn on Wednesday morning first thing; P4 lab order pended and sent to NPOD and patient added to noon huddle. Patient aware if she cannot get to outpatient  [place] lab first thing in the morning she is to make an appointment with us or come to [facility] outpatient lab. Patient aware of medication plan if P4 level looks appropriate. Detailed MyChart sent to patient with plan. Patient agreeable to plan.    10/21/24 at 2:31 PM - Yazmin Camargo RN

## 2024-10-22 ENCOUNTER — SPECIALTY PHARMACY (OUTPATIENT)
Dept: PHARMACY | Facility: CLINIC | Age: 29
End: 2024-10-22

## 2024-10-22 ENCOUNTER — PHARMACY VISIT (OUTPATIENT)
Dept: PHARMACY | Facility: CLINIC | Age: 29
End: 2024-10-22
Payer: COMMERCIAL

## 2024-10-23 ENCOUNTER — LAB (OUTPATIENT)
Dept: LAB | Facility: LAB | Age: 29
End: 2024-10-23
Payer: COMMERCIAL

## 2024-10-23 DIAGNOSIS — N97.9 FEMALE INFERTILITY: ICD-10-CM

## 2024-10-23 LAB
ESTRADIOL SERPL-MCNC: 167 PG/ML
PROGEST SERPL-MCNC: 0.3 NG/ML

## 2024-10-23 PROCEDURE — 84144 ASSAY OF PROGESTERONE: CPT

## 2024-10-23 PROCEDURE — 36415 COLL VENOUS BLD VENIPUNCTURE: CPT

## 2024-10-23 PROCEDURE — 82670 ASSAY OF TOTAL ESTRADIOL: CPT

## 2024-10-23 NOTE — PROGRESS NOTES
Patient supposed to go to  lab today for P4 level prior to starting RAD tomorrow AM.  Will review results with provider.  Maria Parra 10/23/24 8:48 AM      [facility] PROVIDER NOTE  Ultrasound and/or labs reviewed at [facility].   Results for orders placed or performed in visit on 10/23/24 (from the past 96 hours)   Estradiol   Result Value Ref Range    Estradiol 167 pg/mL   Progesterone   Result Value Ref Range    Progesterone 0.3 ng/mL     Wed 10/23 (Day 18)   estradiol 2 mg tablet: Take 6 mg once daily by mouth    Thu 10/24 (Day 19)   progesterone injection: Inject 75 mg once daily into the muscle   estradiol 2 mg tablet: Take 6 mg once daily by mouth   predniSONE tablet: Take 10 mg once daily by mouth    Fri 10/25 (Day 20)   progesterone injection: Inject 75 mg once daily into the muscle   estradiol 2 mg tablet: Take 6 mg once daily by mouth   predniSONE tablet: Take 10 mg once daily by mouth    Sat 10/26 (Day 21)   progesterone injection: Inject 75 mg once daily into the muscle   estradiol 2 mg tablet: Take 6 mg once daily by mouth   predniSONE tablet: Take 10 mg once daily by mouth    Sun 10/27 (Day 22)   progesterone injection: Inject 75 mg once daily into the muscle   estradiol 2 mg tablet: Take 6 mg once daily by mouth   predniSONE tablet: Take 10 mg once daily by mouth    Mon 10/28 (Day 23)   progesterone injection: Inject 75 mg once daily into the muscle   estradiol 2 mg tablet: Take 6 mg once daily by mouth   doxycycline capsule: Take 100 mg 2 times a day by mouth   predniSONE tablet: Take 10 mg once daily by mouth    Tue 10/29 (Day 24)   progesterone injection: Inject 75 mg once daily into the muscle   estradiol 2 mg tablet: Take 6 mg once daily by mouth   aspirin EC tablet: Take 81 mg once daily by mouth   doxycycline capsule: Take 100 mg 2 times a day by mouth   predniSONE tablet: Take 10 mg once daily by mouth    Patient OK to start RAD tomorrow as planned.  RTC as planned for transfer on  10/29/24.  James Wu  10/23/2024  1:03 PM      MyChart with plan, P4 level appropriate to start RAD tomorrow AM with prednisone. Will continue estrace and start doxy the day prior to FET for a total of 4 days and 81mg baby ASA with FET.  Maria Parra 10/23/24 1:22 PM

## 2024-10-28 ENCOUNTER — PREP FOR PROCEDURE (OUTPATIENT)
Dept: ENDOCRINOLOGY | Facility: CLINIC | Age: 29
End: 2024-10-28
Payer: COMMERCIAL

## 2024-10-28 RX ORDER — ACETAMINOPHEN 325 MG/1
650 TABLET ORAL ONCE AS NEEDED
Status: CANCELLED | OUTPATIENT
Start: 2024-10-28

## 2024-10-29 ENCOUNTER — HOSPITAL ENCOUNTER (OUTPATIENT)
Dept: ENDOCRINOLOGY | Facility: CLINIC | Age: 29
Discharge: HOME | End: 2024-10-29

## 2024-10-29 ENCOUNTER — ANCILLARY PROCEDURE (OUTPATIENT)
Dept: ENDOCRINOLOGY | Facility: CLINIC | Age: 29
End: 2024-10-29

## 2024-10-29 DIAGNOSIS — Z31.83 ENCOUNTER FOR ASSISTED REPRODUCTIVE FERTILITY CYCLE: ICD-10-CM

## 2024-10-29 DIAGNOSIS — Z32.00 ENCOUNTER FOR PREGNANCY TEST, RESULT UNKNOWN: Primary | ICD-10-CM

## 2024-10-29 DIAGNOSIS — N97.9 FEMALE INFERTILITY: ICD-10-CM

## 2024-10-29 LAB — PROGEST SERPL-MCNC: 55.5 NG/ML

## 2024-10-29 PROCEDURE — 89255 PREPARE EMBRYO FOR TRANSFER: CPT | Performed by: OBSTETRICS & GYNECOLOGY

## 2024-10-29 PROCEDURE — 58974 EMBRYO TRANSFER INTRAUTERINE: CPT | Performed by: OBSTETRICS & GYNECOLOGY

## 2024-10-29 PROCEDURE — 76998 US GUIDE INTRAOP: CPT | Performed by: OBSTETRICS & GYNECOLOGY

## 2024-10-29 PROCEDURE — 89352 THAWING CRYOPRESRVED EMBRYO: CPT | Performed by: OBSTETRICS & GYNECOLOGY

## 2024-10-29 RX ORDER — ACETAMINOPHEN 325 MG/1
650 TABLET ORAL ONCE AS NEEDED
Status: DISCONTINUED | OUTPATIENT
Start: 2024-10-29 | End: 2024-10-30 | Stop reason: HOSPADM

## 2024-11-04 ENCOUNTER — TELEPHONE (OUTPATIENT)
Dept: ENDOCRINOLOGY | Facility: CLINIC | Age: 29
End: 2024-11-04
Payer: COMMERCIAL

## 2024-11-08 ENCOUNTER — ANCILLARY PROCEDURE (OUTPATIENT)
Dept: ENDOCRINOLOGY | Facility: CLINIC | Age: 29
End: 2024-11-08
Payer: COMMERCIAL

## 2024-11-08 DIAGNOSIS — Z32.00 ENCOUNTER FOR PREGNANCY TEST, RESULT UNKNOWN: ICD-10-CM

## 2024-11-08 LAB — B-HCG SERPL-ACNC: 362 MIU/ML

## 2024-11-08 PROCEDURE — 84702 CHORIONIC GONADOTROPIN TEST: CPT

## 2024-11-08 NOTE — PROGRESS NOTES
Initial HC  Component      Latest Ref Rng 2024   HCG, Beta-Quantitative      <5 mIU/mL 362 (H)    Patient's MACHO Provider: Maria García MD  Infertility dx: Unexplained Infertility, Recurrent Pregnancy Loss, and Polycystic Ovarian Syndrome   Achieved pregnancy by: Embryo transfer 10/29/24 day 5 embryo  Fertility medications used this cycle:  Programmed + baby ASA, Prednisone, Doxy  LMP: Patient's last menstrual period was 10/6/24  EGA based on (IUI date, ET ): embryo transfer date 4w1d    Updated G/P with this pregnancy:   OB HX:  OB History    Para Term  AB Living   2 0 0 0 2 0   SAB IAB Ectopic Multiple Live Births   2 0 0 0 0      # Outcome Date GA Lbr Justin/2nd Weight Sex Type Anes PTL Lv   2 SAB            1 SAB                Type & Screen: 2024  ABO: O  Rh: POS  Antibody: NEG  History of miscarriage: Yes, SAB x2  History of pelvic/abdominal surgeries: No  History of STDs/PID: No  Hx of ectopic: No  Hx of tubal disease/ blockage: No    Risk for ectopic: No      Current medications:     Current Outpatient Medications:     aspirin 81 mg EC tablet, Take 1 tablet (81 mg) by mouth once daily., Disp: 30 tablet, Rfl: 2    doxycycline (Vibramycin) 100 mg capsule, Take 1 capsule (100 mg) by mouth 2 times a day. Take with at least 8 ounces (large glass) of water, do not lie down for 30 minutes after, Disp: 8 capsule, Rfl: 0    estradiol (Estrace) 2 mg tablet, Insert 1 tablet (2 mg) into the vagina 2 times a day., Disp: , Rfl:     estradiol (Estrace) 2 mg tablet, Take 3 tablets (6 mg) by mouth once daily., Disp: 90 tablet, Rfl: 2    levothyroxine (Synthroid, Levoxyl) 50 mcg tablet, Take 2 tablets (100 mcg) by mouth once daily., Disp: 60 tablet, Rfl: 11    lidocaine-prilocaine (Emla) 2.5-2.5 % cream, Apply to treatment area 1 hour prior to injection., Disp: 30 g, Rfl: 2    metFORMIN  mg 24 hr tablet, Take 2 tablets (1,000 mg) by mouth once daily in the evening. Take with meals.  "Do not crush, chew, or split., Disp: 180 tablet, Rfl: 0    predniSONE (Deltasone) 10 mg tablet, Take 1 tablet (10 mg) by mouth once daily. Begin taking the day you start progesterone in oil., Disp: 30 tablet, Rfl: 2    progesterone 50 mg/mL injection, Inject 1.5 mL (75 mg) into the muscle once daily. Inject into the muscle at the same time each morning as directed per provider., Disp: 50 mL, Rfl: 3    transparent dressings 2 3/8 X 2 3/4 \" bandage, Use as directed to cover lidocaine cream once daily, Disp: 30 each, Rfl: 3    PNV: Yes  Vitamin D 2000 IUs: Yes  Luteal support: IM RAD 75 mg daily and Estrace 6 mg PO daily  Additional medications: Metformin 1,000 mg     Patient seen by this RN after blood draw this morning for RAD injection site assessment. Patient has knots forming under skin. Patient without redness, itchiness, swelling, rash, etc. just knots. This RN helped administer morning dose of RAD. Right side is worse than left side; will plan for patient to give right side a break and continue to monitor. This RN to make providers aware.     Labs ordered/Plan:   BhCG ordered. Team will reach out to patient with results and plan.   Discussed early pregnancy versus miscarriage versus ectopic. Precautions given.   Patient expresses understanding of plan and is agreeable.    Yazmin Camargo  11/08/2024  8:07 AM    Repeat HCG in one week.   Maria García 11/08/24 1:27 PM    Phone call to patient to discuss results of blood pregnancy test. Shared with patient that HCG level is 362 and they are 4 weeks and 1 days pregnant. Reviewed with patient that we are cautiously optimistic that this is a good level. Reviewed with patient to continue all current medications and that we will plan to repeat level in 1 week. Reviewed with patient to reach out to their primary OBGYN to schedule new OB visit at this time as patient needs to be seen by 9 weeks gestation. She verbalizes understanding and is agreeable.    New " lab-collect order placed and patient added to noon huddle next Friday 11/15  Yazmin YangT[facility]ations 11/08/24 2:49 PM

## 2024-11-15 ENCOUNTER — LAB (OUTPATIENT)
Dept: LAB | Facility: LAB | Age: 29
End: 2024-11-15
Payer: COMMERCIAL

## 2024-11-15 DIAGNOSIS — Z32.00 ENCOUNTER FOR PREGNANCY TEST, RESULT UNKNOWN: ICD-10-CM

## 2024-11-15 DIAGNOSIS — O36.80X0 ENCOUNTER TO DETERMINE FETAL VIABILITY OF PREGNANCY, NOT APPLICABLE OR UNSPECIFIED FETUS: ICD-10-CM

## 2024-11-15 LAB — B-HCG SERPL-ACNC: 7717 MIU/ML

## 2024-11-15 PROCEDURE — 36415 COLL VENOUS BLD VENIPUNCTURE: CPT

## 2024-11-15 PROCEDURE — 84702 CHORIONIC GONADOTROPIN TEST: CPT

## 2024-11-15 NOTE — PROGRESS NOTES
Initial HC  Component      Latest Ref Rng 2024 11/15/2024   HCG, Beta-Quantitative      <5 mIU/mL 362 (H)  7,717 (H)    Patient's MACHO Provider: Maria García MD  Infertility dx: Unexplained Infertility, Recurrent Pregnancy Loss, and Polycystic Ovarian Syndrome   Achieved pregnancy by: Embryo transfer 10/29/24 day 5 embryo  Fertility medications used this cycle:  Programmed + baby ASA, Prednisone, Doxy  LMP: Patient's last menstrual period was 10/6/24  EGA based on (IUI date, ET ): embryo transfer date 5w1d     Updated G/P with this pregnancy:   OB HX:  OB History    Para Term  AB Living   3 0 0 0 2 0   SAB IAB Ectopic Multiple Live Births   2 0 0 0 0      # Outcome Date GA Lbr Justin/2nd Weight Sex Type Anes PTL Lv   3 Current            2 SAB            1 SAB                Type & Screen: 2024  ABO: O  Rh: POS  Antibody: NEG  History of miscarriage: Yes, SAB x2  History of pelvic/abdominal surgeries: No  History of STDs/PID: No  Hx of ectopic: No  Hx of tubal disease/ blockage: No     Risk for ectopic: No      Current medications:     Current Outpatient Medications:     aspirin 81 mg EC tablet, Take 1 tablet (81 mg) by mouth once daily., Disp: 30 tablet, Rfl: 2    doxycycline (Vibramycin) 100 mg capsule, Take 1 capsule (100 mg) by mouth 2 times a day. Take with at least 8 ounces (large glass) of water, do not lie down for 30 minutes after, Disp: 8 capsule, Rfl: 0    estradiol (Estrace) 2 mg tablet, Insert 1 tablet (2 mg) into the vagina 2 times a day., Disp: , Rfl:     estradiol (Estrace) 2 mg tablet, Take 3 tablets (6 mg) by mouth once daily., Disp: 90 tablet, Rfl: 2    levothyroxine (Synthroid, Levoxyl) 50 mcg tablet, Take 2 tablets (100 mcg) by mouth once daily., Disp: 60 tablet, Rfl: 11    lidocaine-prilocaine (Emla) 2.5-2.5 % cream, Apply to treatment area 1 hour prior to injection., Disp: 30 g, Rfl: 2    metFORMIN  mg 24 hr tablet, Take 2 tablets (1,000 mg) by mouth  "once daily in the evening. Take with meals. Do not crush, chew, or split., Disp: 180 tablet, Rfl: 0    predniSONE (Deltasone) 10 mg tablet, Take 1 tablet (10 mg) by mouth once daily. Begin taking the day you start progesterone in oil., Disp: 30 tablet, Rfl: 2    progesterone 50 mg/mL injection, Inject 1.5 mL (75 mg) into the muscle once daily. Inject into the muscle at the same time each morning as directed per provider., Disp: 50 mL, Rfl: 3    transparent dressings 2 3/8 X 2 3/4 \" bandage, Use as directed to cover lidocaine cream once daily, Disp: 30 each, Rfl: 3    PNV: Yes  Vitamin D 2000 IUs: Yes  Luteal support: IM RAD 75 mg daily and Estrace 6 mg PO daily  Additional medications: Metformin 1,000 mg    Labs ordered/Plan:   [facility] ordered. Team will reach out to patient with results and plan.   Discussed early pregnancy versus miscarriage versus ectopic. Precautions given.   Patient expresses understanding of plan and is agreeable.    Yazmin Camargo  11/15/2024  7:06 AM    [facility] PROVIDER NOTE - HCG REVIEW  Ultrasound and/or labs reviewed at [facility].     Results for orders placed or performed in visit on 11/15/24 (from the past 96 hours)   (Lab Collect) Human Chorionic Gonadotropin, Serum Quantitative   Result Value Ref Range    HCG, Beta-Quantitative 7,717 (H) <5 mIU/mL       Lab Results   Component Value Date    HCGQUANT 7,717 (H) 11/15/2024    HCGQUANT 362 (H) 11/08/2024    HCGQUANT <3 10/18/2023    HCGQUANT 22 (A) 08/09/2023    HCGQUANT 62 (A) 08/02/2023    HCGQUANT 203 (A) 07/26/2023    HCGQUANT 782 (A) 07/19/2023    HCGQUANT 56,564 (A) 07/10/2023       RTC in AT APPROX 6 weeks 5 DAYS GESTATION for   OB ultrasound .   Dasha Carrasco  11/15/2024  12:31 PM    MyChart response sent to patient with plan. Patient to schedule early OB scan for 11/25 (will be 6 weeks 4 days, but unable to do scan on 11/28 with Thanksgiving falling on that day).    11/15/24 at 1:41 PM - Yazmin Camargo RN      "

## 2024-11-18 ENCOUNTER — SPECIALTY PHARMACY (OUTPATIENT)
Dept: PHARMACY | Facility: CLINIC | Age: 29
End: 2024-11-18

## 2024-11-18 ENCOUNTER — PHARMACY VISIT (OUTPATIENT)
Dept: PHARMACY | Facility: CLINIC | Age: 29
End: 2024-11-18
Payer: COMMERCIAL

## 2024-11-18 PROCEDURE — RXMED WILLOW AMBULATORY MEDICATION CHARGE

## 2024-11-18 NOTE — TELEPHONE ENCOUNTER
Telephone call returned to patient. Patient inquiring on if she has refills available of medications through [facility] (RAD, Prednisone). This RN informed patient that she does have refills available and provided [facility] contact information to patient. Patient agreeable and states no further questions.    11/18/24 at 11:21 AM - Yazmin Camargo RN

## 2024-11-19 ENCOUNTER — SPECIALTY PHARMACY (OUTPATIENT)
Dept: PHARMACY | Facility: CLINIC | Age: 29
End: 2024-11-19

## 2024-11-25 ENCOUNTER — OFFICE VISIT (OUTPATIENT)
Dept: ENDOCRINOLOGY | Facility: CLINIC | Age: 29
End: 2024-11-25
Payer: COMMERCIAL

## 2024-11-25 ENCOUNTER — ANCILLARY PROCEDURE (OUTPATIENT)
Dept: ENDOCRINOLOGY | Facility: CLINIC | Age: 29
End: 2024-11-25
Payer: COMMERCIAL

## 2024-11-25 DIAGNOSIS — O36.80X0 ENCOUNTER TO DETERMINE FETAL VIABILITY OF PREGNANCY, SINGLE OR UNSPECIFIED FETUS: Primary | ICD-10-CM

## 2024-11-25 DIAGNOSIS — O36.80X0 ENCOUNTER TO DETERMINE FETAL VIABILITY OF PREGNANCY, NOT APPLICABLE OR UNSPECIFIED FETUS: ICD-10-CM

## 2024-11-25 PROCEDURE — 1036F TOBACCO NON-USER: CPT | Performed by: NURSE PRACTITIONER

## 2024-11-25 PROCEDURE — 99213 OFFICE O/P EST LOW 20 MIN: CPT | Performed by: NURSE PRACTITIONER

## 2024-11-25 PROCEDURE — 76817 TRANSVAGINAL US OBSTETRIC: CPT | Performed by: OBSTETRICS & GYNECOLOGY

## 2024-11-25 PROCEDURE — 76817 TRANSVAGINAL US OBSTETRIC: CPT

## 2024-11-25 NOTE — PROGRESS NOTES
Visit Type: In Person    OB Scan    Ectopic risk factor: no  PGTA/PGTM: yes Euploid  Blood type: O  Method of Conception: Frozen Embryo transfer     Reviewed results from OB ultrasound today and results reviewed with pt as follows:     OB Scan results:   Concep??on Concep??on: IVF Embryo Transfer  Embryo  transfer  10/29/2024 IVF / ET: 5 d 6 w + 4 d 7/17/2025  Stated BROOKE 6 w + 4 d 7/17/2025  U/S 11/25/2024 based upon CRL 6 w + 5 d 7/16/2025  Assigned  da??ng  based on the IVF / ET date, selected on  11/25/2024  6 w + 4 d 7/17/2025  Assessment Gesta??onal sac: visualized. Loca??on: intrauterine  Yolk sac: visualized  Embryo: visualized  Cardiac ac??vity: present  Early placenta: circumferen??al  YS 3.3 mm 5w 6d 25% Papaioannou  CRL 6.1 mm 6w 5d 68% Pexsters   bpm    Detailed discussion with patient about her scan results, pregnancy, and next steps:    Plan:   Reviewed medications in detail. She will continue PNV.   Reviewed supplemental progesterone, route and dose, reviewed dates of cessation. 10 weeks 6 days- 12/25/24 (estrace and RAD and prednisone)  Baby aspirin will probably stop at 12 weeks.  Continue PNV and synthroid.  Discussed with patient any pregnancy concerns and discussed establishing care with an  OB.  Will provide recommendations if she desires.   Advised to call with bleeding, pain or concerns.    Ultrasound results and Plan of care reviewed with Dr. James Wu MD    Fertility Plan Update: Ok to move on to OB.    Rowan POWERS Sudhakar  11/25/2024  1:27 PM

## 2024-11-26 ENCOUNTER — LAB (OUTPATIENT)
Dept: LAB | Facility: LAB | Age: 29
End: 2024-11-26
Payer: COMMERCIAL

## 2024-11-26 ENCOUNTER — TELEPHONE (OUTPATIENT)
Dept: ENDOCRINOLOGY | Facility: CLINIC | Age: 29
End: 2024-11-26
Payer: COMMERCIAL

## 2024-11-26 DIAGNOSIS — E03.8 SUBCLINICAL HYPOTHYROIDISM: ICD-10-CM

## 2024-11-26 DIAGNOSIS — Z32.01 POSITIVE BLOOD PREGNANCY TEST (HHS-HCC): ICD-10-CM

## 2024-11-26 DIAGNOSIS — R68.83 CHILLS: ICD-10-CM

## 2024-11-26 PROCEDURE — 80053 COMPREHEN METABOLIC PANEL: CPT

## 2024-11-26 PROCEDURE — 36415 COLL VENOUS BLD VENIPUNCTURE: CPT

## 2024-11-26 PROCEDURE — 84443 ASSAY THYROID STIM HORMONE: CPT

## 2024-11-26 NOTE — TELEPHONE ENCOUNTER
Reason for call:   Notes: Patient has some questions about feeling sick and would like to speak with a nurse

## 2024-11-27 ENCOUNTER — DOCUMENTATION (OUTPATIENT)
Dept: ENDOCRINOLOGY | Facility: CLINIC | Age: 29
End: 2024-11-27
Payer: COMMERCIAL

## 2024-11-27 ENCOUNTER — TELEPHONE (OUTPATIENT)
Dept: ENDOCRINOLOGY | Facility: CLINIC | Age: 29
End: 2024-11-27
Payer: COMMERCIAL

## 2024-11-27 ENCOUNTER — HOSPITAL ENCOUNTER (EMERGENCY)
Facility: HOSPITAL | Age: 29
Discharge: HOME | End: 2024-11-27
Attending: STUDENT IN AN ORGANIZED HEALTH CARE EDUCATION/TRAINING PROGRAM
Payer: COMMERCIAL

## 2024-11-27 VITALS
BODY MASS INDEX: 27.28 KG/M2 | OXYGEN SATURATION: 100 % | SYSTOLIC BLOOD PRESSURE: 102 MMHG | TEMPERATURE: 97.5 F | RESPIRATION RATE: 19 BRPM | DIASTOLIC BLOOD PRESSURE: 76 MMHG | WEIGHT: 169.75 LBS | HEART RATE: 88 BPM | HEIGHT: 66 IN

## 2024-11-27 DIAGNOSIS — O09.291: Primary | ICD-10-CM

## 2024-11-27 DIAGNOSIS — R68.83 CHILLS: Primary | ICD-10-CM

## 2024-11-27 LAB
ALBUMIN SERPL BCP-MCNC: 3.8 G/DL (ref 3.4–5)
ALBUMIN SERPL BCP-MCNC: 4 G/DL (ref 3.4–5)
ALP SERPL-CCNC: 91 U/L (ref 33–110)
ALP SERPL-CCNC: 94 U/L (ref 33–110)
ALT SERPL W P-5'-P-CCNC: 22 U/L (ref 7–45)
ALT SERPL W P-5'-P-CCNC: 36 U/L (ref 7–45)
ANION GAP SERPL CALC-SCNC: 10 MMOL/L (ref 10–20)
ANION GAP SERPL CALC-SCNC: 15 MMOL/L
AST SERPL W P-5'-P-CCNC: 26 U/L (ref 9–39)
AST SERPL W P-5'-P-CCNC: 33 U/L (ref 9–39)
B-HCG SERPL-ACNC: ABNORMAL MIU/ML
BASOPHILS # BLD AUTO: 0.05 X10*3/UL (ref 0–0.1)
BASOPHILS NFR BLD AUTO: 0.5 %
BILIRUB SERPL-MCNC: 0.4 MG/DL (ref 0–1.2)
BILIRUB SERPL-MCNC: 0.5 MG/DL (ref 0–1.2)
BUN SERPL-MCNC: 7 MG/DL (ref 6–23)
BUN SERPL-MCNC: 7 MG/DL (ref 6–23)
CALCIUM SERPL-MCNC: 8.7 MG/DL (ref 8.6–10.3)
CALCIUM SERPL-MCNC: 9.3 MG/DL (ref 8.6–10.6)
CHLORIDE SERPL-SCNC: 102 MMOL/L (ref 98–107)
CHLORIDE SERPL-SCNC: 107 MMOL/L (ref 98–107)
CO2 SERPL-SCNC: 25 MMOL/L (ref 21–32)
CO2 SERPL-SCNC: 26 MMOL/L (ref 21–32)
CREAT SERPL-MCNC: 0.57 MG/DL (ref 0.5–1.05)
CREAT SERPL-MCNC: 0.57 MG/DL (ref 0.5–1.05)
EGFRCR SERPLBLD CKD-EPI 2021: >90 ML/MIN/1.73M*2
EGFRCR SERPLBLD CKD-EPI 2021: >90 ML/MIN/1.73M*2
EOSINOPHIL # BLD AUTO: 0.2 X10*3/UL (ref 0–0.7)
EOSINOPHIL NFR BLD AUTO: 1.9 %
ERYTHROCYTE [DISTWIDTH] IN BLOOD BY AUTOMATED COUNT: 13.2 % (ref 11.5–14.5)
GLUCOSE SERPL-MCNC: 87 MG/DL (ref 74–99)
GLUCOSE SERPL-MCNC: 98 MG/DL (ref 74–99)
HCT VFR BLD AUTO: 38.9 % (ref 36–46)
HGB BLD-MCNC: 13 G/DL (ref 12–16)
IMM GRANULOCYTES # BLD AUTO: 0.04 X10*3/UL (ref 0–0.7)
IMM GRANULOCYTES NFR BLD AUTO: 0.4 % (ref 0–0.9)
LYMPHOCYTES # BLD AUTO: 0.69 X10*3/UL (ref 1.2–4.8)
LYMPHOCYTES NFR BLD AUTO: 6.5 %
MAGNESIUM SERPL-MCNC: 2.04 MG/DL (ref 1.6–2.4)
MCH RBC QN AUTO: 25.9 PG (ref 26–34)
MCHC RBC AUTO-ENTMCNC: 33.4 G/DL (ref 32–36)
MCV RBC AUTO: 78 FL (ref 80–100)
MONOCYTES # BLD AUTO: 0.35 X10*3/UL (ref 0.1–1)
MONOCYTES NFR BLD AUTO: 3.3 %
NEUTROPHILS # BLD AUTO: 9.25 X10*3/UL (ref 1.2–7.7)
NEUTROPHILS NFR BLD AUTO: 87.4 %
NRBC BLD-RTO: 0 /100 WBCS (ref 0–0)
PLATELET # BLD AUTO: 349 X10*3/UL (ref 150–450)
POTASSIUM SERPL-SCNC: 4.2 MMOL/L (ref 3.5–5.3)
POTASSIUM SERPL-SCNC: 6.1 MMOL/L (ref 3.5–5.3)
PROT SERPL-MCNC: 6.9 G/DL (ref 6.4–8.2)
PROT SERPL-MCNC: 6.9 G/DL (ref 6.4–8.2)
RBC # BLD AUTO: 5.02 X10*6/UL (ref 4–5.2)
SODIUM SERPL-SCNC: 137 MMOL/L (ref 136–145)
SODIUM SERPL-SCNC: 138 MMOL/L (ref 136–145)
TSH SERPL-ACNC: 0.44 MIU/L (ref 0.44–3.98)
WBC # BLD AUTO: 10.6 X10*3/UL (ref 4.4–11.3)

## 2024-11-27 PROCEDURE — 36415 COLL VENOUS BLD VENIPUNCTURE: CPT

## 2024-11-27 PROCEDURE — 85025 COMPLETE CBC W/AUTO DIFF WBC: CPT

## 2024-11-27 PROCEDURE — 80053 COMPREHEN METABOLIC PANEL: CPT

## 2024-11-27 PROCEDURE — 99283 EMERGENCY DEPT VISIT LOW MDM: CPT

## 2024-11-27 PROCEDURE — 84702 CHORIONIC GONADOTROPIN TEST: CPT

## 2024-11-27 PROCEDURE — 83735 ASSAY OF MAGNESIUM: CPT

## 2024-11-27 ASSESSMENT — PAIN DESCRIPTION - DESCRIPTORS
DESCRIPTORS: HEAVINESS
DESCRIPTORS: DISCOMFORT

## 2024-11-27 ASSESSMENT — COLUMBIA-SUICIDE SEVERITY RATING SCALE - C-SSRS
2. HAVE YOU ACTUALLY HAD ANY THOUGHTS OF KILLING YOURSELF?: NO
1. IN THE PAST MONTH, HAVE YOU WISHED YOU WERE DEAD OR WISHED YOU COULD GO TO SLEEP AND NOT WAKE UP?: NO
6. HAVE YOU EVER DONE ANYTHING, STARTED TO DO ANYTHING, OR PREPARED TO DO ANYTHING TO END YOUR LIFE?: NO

## 2024-11-27 ASSESSMENT — PAIN DESCRIPTION - PROGRESSION: CLINICAL_PROGRESSION: NOT CHANGED

## 2024-11-27 ASSESSMENT — PAIN DESCRIPTION - ORIENTATION: ORIENTATION: MID

## 2024-11-27 ASSESSMENT — PAIN DESCRIPTION - LOCATION: LOCATION: CHEST

## 2024-11-27 ASSESSMENT — PAIN SCALES - GENERAL
PAINLEVEL_OUTOF10: 0 - NO PAIN
PAINLEVEL_OUTOF10: 3

## 2024-11-27 ASSESSMENT — PAIN DESCRIPTION - PAIN TYPE: TYPE: ACUTE PAIN

## 2024-11-27 ASSESSMENT — PAIN DESCRIPTION - FREQUENCY: FREQUENCY: CONSTANT/CONTINUOUS

## 2024-11-27 ASSESSMENT — PAIN DESCRIPTION - ONSET: ONSET: GRADUAL

## 2024-11-27 ASSESSMENT — PAIN - FUNCTIONAL ASSESSMENT
PAIN_FUNCTIONAL_ASSESSMENT: 0-10
PAIN_FUNCTIONAL_ASSESSMENT: 0-10

## 2024-11-27 NOTE — PROGRESS NOTES
Component      Latest Ref Rng 11/26/2024   GLUCOSE      74 - 99 mg/dL 98    SODIUM      136 - 145 mmol/L 137    POTASSIUM      3.5 - 5.3 mmol/L 6.1 (HH)    CHLORIDE      98 - 107 mmol/L 102    Bicarbonate      21 - 32 mmol/L 26    Anion Gap      mmol/L 15    Blood Urea Nitrogen      6 - 23 mg/dL 7    Creatinine      0.50 - 1.05 mg/dL 0.57    EGFR      >60 mL/min/1.73m*2 >90    Calcium      8.6 - 10.6 mg/dL 9.3    Albumin      3.4 - 5.0 g/dL 4.0    Alkaline Phosphatase      33 - 110 U/L 94    Total Protein      6.4 - 8.2 g/dL 6.9    AST      9 - 39 U/L 26    Bilirubin Total      0.0 - 1.2 mg/dL 0.4    ALT      7 - 45 U/L 22       Legend:  (HH) High Panic      Called patient to discuss. Reviewed results with patient and  after speaking with Dr. García. She states today she feels better after having fluids and sleep. Reviewed that having an elevated K can affect her heart and Is serious so we do want her to be evaluated in the ER. She really did not feel necessary since feeling better but encouraged her to go so she can get more monitoring.   Spoke with  and he will take her ED.    Rowan De La Fuente 11/27/24 10:45 AM

## 2024-11-27 NOTE — TELEPHONE ENCOUNTER
Reason for call:   Notes: this patient is in the ER and does not know what to tell them. Pt wants a call back asap.

## 2024-11-27 NOTE — ED PROVIDER NOTES
HPI   Chief Complaint   Patient presents with    Chest Pain    Abnormal Potassium       30 yo F who is currently 9 weeks pregnant presents for abnormal outpatient lab. Patient has had IVF pregnancy complicated by intermittent chills and bilateral lower extremity cramping. She had routine blood work performed outpatient that showed potassium 6.1 and she received a call to come to the emergency department. She states she has had intermittent cramping in her calves that is relieved by massage. She notes that in the afternoons she tends to get chills for a period of a few hours that then resolve. She is taking prednisone, estradiol and progesterone currently per her OB, all of which she takes in the morning. She denies other medical history or medications. She denies palpitations, chest pain, SOB, abdominal pain, vomiting, diarrhea, dysuria, neck or back pain, flank pain, fevers, vaginal bleeding, or vaginal discharge.              Patient History   Past Medical History:   Diagnosis Date    History of miscarriage     PCOS (polycystic ovarian syndrome)      Past Surgical History:   Procedure Laterality Date    ANAL FISTULOTOMY  2023    DILATION AND CURETTAGE OF UTERUS      OTHER SURGICAL HISTORY  08/15/2024    Oocyte Retrieval     No family history on file.  Social History     Tobacco Use    Smoking status: Never    Smokeless tobacco: Never   Substance Use Topics    Alcohol use: Not Currently    Drug use: Never       Physical Exam   ED Triage Vitals [11/27/24 1210]   Temperature Heart Rate Respirations BP   36.4 °C (97.5 °F) (!) 102 18 118/73      Pulse Ox Temp Source Heart Rate Source Patient Position   100 % Temporal Monitor Sitting      BP Location FiO2 (%)     Right arm --       Physical Exam  Vitals reviewed.   Constitutional:       Appearance: She is not toxic-appearing.   Eyes:      Pupils: Pupils are equal, round, and reactive to light.   Cardiovascular:      Rate and Rhythm: Normal rate and regular rhythm.    Pulmonary:      Effort: Pulmonary effort is normal.      Breath sounds: Normal breath sounds.   Abdominal:      Palpations: Abdomen is soft.      Tenderness: There is no abdominal tenderness. There is no guarding or rebound.   Musculoskeletal:         General: Normal range of motion.      Cervical back: Normal range of motion.   Skin:     General: Skin is warm and dry.      Capillary Refill: Capillary refill takes less than 2 seconds.   Neurological:      General: No focal deficit present.      Mental Status: She is alert.           ED Course & MDM   Diagnoses as of 11/28/24 0310   Pregnancy with prior adverse outcome in first trimester ([facility])                 No data recorded     Margie Coma Scale Score: 15 (11/27/24 1246 : Natalie Lockhart RN)                           Medical Decision Making  30 yo F who is currently 9 weeks pregnant presents for abnormal outpatient lab. Patient has had IVF pregnancy complicated by intermittent chills and bilateral lower extremity cramping. She had routine blood work performed outpatient that showed potassium 6.1 and she received a call to come to the emergency department. Patient otherwise asymptomatic. Vitals stable. Nontoxic appearing. Resting comfortably. Abdomen soft and NT. Compartments soft to BLE. High suspicion for lab hemolysis causing falsely elevated potassium given patient's lack of concurrent symptoms, exam findings or any risk factors. Discussed this with patient. Potassium on our eval is 4.2. Discussed symptoms, encouraged increased hydration to prevent lower extremity cramping. Chills may be related to hormonal shifts from estradiol and progesterone, which patient states she only has to take for another week. She has no other complaints and is otherwise well appearing. Discussed close follow up with OB. Discussed return precautions. Patient and spouse voiced understanding and agreement with plan.    Amount and/or Complexity of Data Reviewed  Independent  Historian: spouse        Procedure  Procedures     Ginette Butler MD  11/28/24 3331

## 2024-11-27 NOTE — PROGRESS NOTES
Patient presented to outside  laboratory yesterday for TSH check.     29yr old patient of Dr. García. Last TSH ~ 1 year ago of 2.53; patient has been taking Synthroid 100 mcg and requested a repeat TSH level.     Component  Ref Range & Units 1 d ago  (11/26/24) 1 yr ago  (8/9/23)   Thyroid Stimulating Hormone  0.44 - 3.98 mIU/L 0.44 2.53      11/27/24 at 7:03 AM - Yazmin Camargo RN      Component      Latest Ref Rng 11/26/2024   POTASSIUM      3.5 - 5.3 mmol/L 6.1 ()       Critical lab result of K+ of 6.1 - MAlex De La Fuente CNP spoke to patient/partner encouraging patient to go to nearest emergency room. Patient partner to take her to ED.    11/27/24 at 10:51 AM - Yazmin Camargo RN    See telephone note with this RN/partner. Patient added to noon stacia for Friday review.    11/27/24 at 12:26 PM - Yazmin Camargo RN

## 2024-11-27 NOTE — ED TRIAGE NOTES
Patient presents to ED from home with  for hyperkalemia from a blood draw yesterday. Patient is 7 weeks pregnant and feeling sicker than usual and had her thyroid tested and all of her blood work was normal except the potassium. Patient is having chest pain that is heavy in nature. She is an IVF patient and on metformin.

## 2024-11-29 ENCOUNTER — APPOINTMENT (OUTPATIENT)
Dept: ENDOCRINOLOGY | Facility: CLINIC | Age: 29
End: 2024-11-29
Payer: COMMERCIAL

## 2024-12-10 ENCOUNTER — APPOINTMENT (OUTPATIENT)
Dept: OBSTETRICS AND GYNECOLOGY | Facility: CLINIC | Age: 29
End: 2024-12-10
Payer: COMMERCIAL

## 2024-12-10 VITALS — BODY MASS INDEX: 27.28 KG/M2 | DIASTOLIC BLOOD PRESSURE: 69 MMHG | SYSTOLIC BLOOD PRESSURE: 105 MMHG | WEIGHT: 169 LBS

## 2024-12-10 DIAGNOSIS — Z11.3 SCREEN FOR SEXUALLY TRANSMITTED DISEASES: ICD-10-CM

## 2024-12-10 DIAGNOSIS — Z11.59 SCREENING FOR VIRAL DISEASE: ICD-10-CM

## 2024-12-10 DIAGNOSIS — O20.9 BLEEDING IN EARLY PREGNANCY (HHS-HCC): ICD-10-CM

## 2024-12-10 DIAGNOSIS — Z3A.08 8 WEEKS GESTATION OF PREGNANCY (HHS-HCC): Primary | ICD-10-CM

## 2024-12-10 PROBLEM — N96 RECURRENT PREGNANCY LOSS WITHOUT CURRENT PREGNANCY: Status: ACTIVE | Noted: 2024-12-10

## 2024-12-10 PROBLEM — E03.8 SUBCLINICAL HYPOTHYROIDISM: Status: ACTIVE | Noted: 2024-12-10

## 2024-12-10 PROBLEM — K60.30 ANAL FISTULA: Status: ACTIVE | Noted: 2023-01-03

## 2024-12-10 PROBLEM — N97.9 FEMALE INFERTILITY: Status: ACTIVE | Noted: 2024-12-10

## 2024-12-10 LAB
ERYTHROCYTE [DISTWIDTH] IN BLOOD BY AUTOMATED COUNT: 13.3 % (ref 11.5–14.5)
HCT VFR BLD AUTO: 40.3 % (ref 36–46)
HGB BLD-MCNC: 13 G/DL (ref 12–16)
MCH RBC QN AUTO: 25.7 PG (ref 26–34)
MCHC RBC AUTO-ENTMCNC: 32.3 G/DL (ref 32–36)
MCV RBC AUTO: 80 FL (ref 80–100)
NRBC BLD-RTO: 0 /100 WBCS (ref 0–0)
PLATELET # BLD AUTO: 400 X10*3/UL (ref 150–450)
RBC # BLD AUTO: 5.05 X10*6/UL (ref 4–5.2)
REFLEX ADDED, ANEMIA PANEL: NORMAL
WBC # BLD AUTO: 10.2 X10*3/UL (ref 4.4–11.3)

## 2024-12-10 PROCEDURE — 85027 COMPLETE CBC AUTOMATED: CPT

## 2024-12-10 PROCEDURE — 87340 HEPATITIS B SURFACE AG IA: CPT

## 2024-12-10 PROCEDURE — 86704 HEP B CORE ANTIBODY TOTAL: CPT

## 2024-12-10 PROCEDURE — 86780 TREPONEMA PALLIDUM: CPT

## 2024-12-10 PROCEDURE — 87591 N.GONORRHOEAE DNA AMP PROB: CPT

## 2024-12-10 PROCEDURE — 83036 HEMOGLOBIN GLYCOSYLATED A1C: CPT

## 2024-12-10 PROCEDURE — 87389 HIV-1 AG W/HIV-1&-2 AB AG IA: CPT

## 2024-12-10 PROCEDURE — 0500F INITIAL PRENATAL CARE VISIT: CPT | Performed by: ADVANCED PRACTICE MIDWIFE

## 2024-12-10 PROCEDURE — 87086 URINE CULTURE/COLONY COUNT: CPT

## 2024-12-10 PROCEDURE — 86850 RBC ANTIBODY SCREEN: CPT

## 2024-12-10 PROCEDURE — 86901 BLOOD TYPING SEROLOGIC RH(D): CPT

## 2024-12-10 PROCEDURE — 86317 IMMUNOASSAY INFECTIOUS AGENT: CPT

## 2024-12-10 PROCEDURE — 86803 HEPATITIS C AB TEST: CPT

## 2024-12-10 PROCEDURE — 86900 BLOOD TYPING SEROLOGIC ABO: CPT

## 2024-12-10 PROCEDURE — 87491 CHLMYD TRACH DNA AMP PROBE: CPT

## 2024-12-10 PROCEDURE — 86706 HEP B SURFACE ANTIBODY: CPT

## 2024-12-10 PROCEDURE — 83020 HEMOGLOBIN ELECTROPHORESIS: CPT | Performed by: ADVANCED PRACTICE MIDWIFE

## 2024-12-10 PROCEDURE — 83021 HEMOGLOBIN CHROMOTOGRAPHY: CPT

## 2024-12-10 RX ORDER — PNV 119/IRON FUM/FOLIC ACID 29 MG-1 MG
TABLET ORAL
COMMUNITY
Start: 2021-06-03

## 2024-12-10 RX ORDER — ACETAMINOPHEN 500 MG
TABLET ORAL
COMMUNITY
Start: 2023-03-28

## 2024-12-10 ASSESSMENT — EDINBURGH POSTNATAL DEPRESSION SCALE (EPDS)
THE THOUGHT OF HARMING MYSELF HAS OCCURRED TO ME: NEVER
I HAVE BLAMED MYSELF UNNECESSARILY WHEN THINGS WENT WRONG: NO, NEVER
I HAVE BEEN ABLE TO LAUGH AND SEE THE FUNNY SIDE OF THINGS: AS MUCH AS I ALWAYS COULD
I HAVE BEEN ANXIOUS OR WORRIED FOR NO GOOD REASON: NO, NOT AT ALL
I HAVE FELT SCARED OR PANICKY FOR NO GOOD REASON: NO, NOT AT ALL
I HAVE LOOKED FORWARD WITH ENJOYMENT TO THINGS: AS MUCH AS I EVER DID
THINGS HAVE BEEN GETTING ON TOP OF ME: NO, I HAVE BEEN COPING AS WELL AS EVER
TOTAL SCORE: 0
I HAVE BEEN SO UNHAPPY THAT I HAVE HAD DIFFICULTY SLEEPING: NOT AT ALL
I HAVE FELT SAD OR MISERABLE: NO, NOT AT ALL
I HAVE BEEN SO UNHAPPY THAT I HAVE BEEN CRYING: NO, NEVER

## 2024-12-10 NOTE — PROGRESS NOTES
Subjective   Patient ID [id number]   Delia Hernández is a 29 y.o.  at 8w5d with a working estimated date of delivery of 2025, by Embryo Transfer who presents for an initial prenatal visit. This pregnancy is planned.    Her pregnancy is complicated by:  IVF pregnancy - Embryo transfer 10/29    OB History    Para Term  AB Living   3 0 0 0 2     SAB IAB Ectopic Multiple Live Births   2 0 0          # Outcome Date GA Lbr Justin/2nd Weight Sex Type Anes PTL Lv   3 Current            2 SAB            1 SAB      SAB        [place]  Depression Scale Total: 0    Objective   Physical Exam  Weight: 76.7 kg (169 lb)  Expected Total Weight Gain: 7 kg (15 lb)-11.5 kg (25 lb)   Pregravid BMI: 28.42  BP: 105/69          Physical Exam  Constitutional:       Appearance: Normal appearance. She is normal weight.   Genitourinary:      Genitourinary Comments: Declined    HENT:      Head: Normocephalic and atraumatic.   Cardiovascular:      Rate and Rhythm: Normal rate and regular rhythm.      Pulses: Normal pulses.   Pulmonary:      Effort: Pulmonary effort is normal.   Abdominal:      General: Abdomen is flat. Bowel sounds are normal.      Palpations: Abdomen is soft.   Skin:     General: Skin is warm and dry.   Psychiatric:         Mood and Affect: Mood normal.         Behavior: Behavior normal.         Thought Content: Thought content normal.         Judgment: Judgment normal.         Prenatal Labs  Ordered  [facility]     Assessment/Plan   Problem List Items Addressed This Visit             ICD-10-CM       Ob-Gyn Problems    8 weeks gestation of pregnancy ([facility]) - Primary Z3A.08    Relevant Orders    [facility] OB imaging order    CBC Anemia Panel With Reflex (Completed)    Hemoglobin identification with path review (Completed)    Hep B Core Ab, Total (Completed)    Hep B Surface Ab (Completed)    Hep B surface Ag (Completed)    Hep C Ab (Completed)    Hgb  A1C (Completed)    HIV 1/2 Screen with Reflex  (Completed)    Rubella Ab, IgG (Completed)    Syphilis Screen with Reflex (Completed)    Type And Screen    Urine Culture clinic collect (Completed)    Urine GC / [place]lam clinic collect (Completed)     Other Visit Diagnoses         Codes    Screening for viral disease     Z11.59    Relevant Orders    Hep B Core Ab, Total (Completed)    Screen for sexually transmitted diseases     Z11.3    Relevant Orders    Hep B Surface Ab (Completed)    Hep B surface Ag (Completed)    Hep C Ab (Completed)    HIV 1/2 Screen with Reflex (Completed)    Syphilis Screen with Reflex (Completed)    Bleeding in early pregnancy ([facility])     O20.9    Relevant Orders    US [facility] OB imaging order    CBC Anemia Panel With Reflex (Completed)            Immunizations: reviewed   Prenatal Labs ordered  Daily prenatal vitamins prescribed  First trimester screening and second trimester screening discussed. Patient decided to myriad testing done on the embryo  Follow up in 4 weeks for return OB visit.

## 2024-12-11 ENCOUNTER — TELEPHONE (OUTPATIENT)
Dept: ENDOCRINOLOGY | Facility: CLINIC | Age: 29
End: 2024-12-11
Payer: COMMERCIAL

## 2024-12-11 ENCOUNTER — HOSPITAL ENCOUNTER (OUTPATIENT)
Dept: RADIOLOGY | Facility: CLINIC | Age: 29
End: 2024-12-11
Payer: COMMERCIAL

## 2024-12-11 ENCOUNTER — LAB REQUISITION (OUTPATIENT)
Dept: LAB | Facility: HOSPITAL | Age: 29
End: 2024-12-11
Payer: COMMERCIAL

## 2024-12-11 ENCOUNTER — PATIENT MESSAGE (OUTPATIENT)
Dept: OBSTETRICS AND GYNECOLOGY | Facility: CLINIC | Age: 29
End: 2024-12-11
Payer: COMMERCIAL

## 2024-12-11 DIAGNOSIS — Z3A.08 8 WEEKS GESTATION OF PREGNANCY (HHS-HCC): ICD-10-CM

## 2024-12-11 LAB
ABO GROUP (TYPE) IN BLOOD: NORMAL
ANTIBODY SCREEN: NORMAL
BACTERIA UR CULT: NORMAL
C TRACH RRNA SPEC QL NAA+PROBE: NEGATIVE
EST. AVERAGE GLUCOSE BLD GHB EST-MCNC: 103 MG/DL
HBA1C MFR BLD: 5.2 %
HBV CORE AB SER QL: NONREACTIVE
HBV SURFACE AB SER-ACNC: <3.1 MIU/ML
HBV SURFACE AG SERPL QL IA: NONREACTIVE
HCV AB SER QL: NONREACTIVE
HIV 1+2 AB+HIV1 P24 AG SERPL QL IA: NONREACTIVE
N GONORRHOEA DNA SPEC QL PROBE+SIG AMP: NEGATIVE
RH FACTOR (ANTIGEN D): NORMAL
RUBV IGG SERPL IA-ACNC: 3 IA
RUBV IGG SERPL QL IA: POSITIVE
TREPONEMA PALLIDUM IGG+IGM AB [PRESENCE] IN SERUM OR PLASMA BY IMMUNOASSAY: NONREACTIVE

## 2024-12-11 NOTE — TELEPHONE ENCOUNTER
Checked with ZEYNEP De La Fuente CNP, since patient has graduated and is 8w6d our office cannot accommodate another scan for them.       Telephone call returned to patient and partner. This RN relayed provider information above. Patient and partner express understanding. Patient inquiring about when to stop FET meds. This RN informed patient that last day of estrace, RAD and prednisone is 12/25/24, will likely stop baby ASA at 12 weeks and to continue her other medications and supplements. Patient had updated this RN that her spotting has stopped. Patient and partner express no other questions.      12/11/24 at 3:34 PM - Yazmin Camargo RN

## 2024-12-11 NOTE — TELEPHONE ENCOUNTER
Reason for call: Patient partner calling about OB scan for Delia asking if we can accommodate her here since she is spotting. He has not reached his deductible with insurance and [facility] is sending to his insurance.  Notes:

## 2024-12-12 LAB
HEMOGLOBIN A2: 2.6 % (ref 2–3.5)
HEMOGLOBIN A: 97.1 % (ref 95.8–98)
HEMOGLOBIN F: 0.3 % (ref 0–2)
HEMOGLOBIN IDENTIFICATION INTERPRETATION: ABNORMAL
PATH REVIEW-HGB IDENTIFICATION: ABNORMAL

## 2024-12-13 ENCOUNTER — HOSPITAL ENCOUNTER (OUTPATIENT)
Dept: RADIOLOGY | Facility: HOSPITAL | Age: 29
Discharge: HOME | End: 2024-12-13
Payer: COMMERCIAL

## 2024-12-13 DIAGNOSIS — Z3A.08 8 WEEKS GESTATION OF PREGNANCY (HHS-HCC): ICD-10-CM

## 2024-12-13 DIAGNOSIS — O20.9 BLEEDING IN EARLY PREGNANCY (HHS-HCC): ICD-10-CM

## 2024-12-13 PROBLEM — D64.9 MILD ANEMIA: Status: ACTIVE | Noted: 2024-12-13

## 2024-12-13 PROCEDURE — 76801 OB US < 14 WKS SINGLE FETUS: CPT

## 2024-12-17 DIAGNOSIS — N97.9 FEMALE INFERTILITY: ICD-10-CM

## 2024-12-17 RX ORDER — PREDNISONE 10 MG/1
10 TABLET ORAL DAILY
Qty: 5 TABLET | Refills: 0 | Status: SHIPPED | OUTPATIENT
Start: 2024-12-17 | End: 2025-12-17

## 2024-12-19 ENCOUNTER — TELEPHONE (OUTPATIENT)
Dept: ENDOCRINOLOGY | Facility: CLINIC | Age: 29
End: 2024-12-19
Payer: COMMERCIAL

## 2024-12-19 NOTE — TELEPHONE ENCOUNTER
Telephone call returned to patient. Patient is in between being graduated from our clinic and an initial OB visit with her OB-GYN; she did have a visit with a midwife but has not been seen by her main OB-GYN doctor yet and that appointment is on 1/8. Patient double checking it is okay to stop her Estrace, RAD, and Prednisone on 12/25 as directed. Patient inquiring on when to stop baby ASA; this RN informed her that her OB will discuss this with her. Patient aware to continue all other medications: Synthroid, Metformin, PNV, Vitamin D.     12/19/24 at 10:37 AM - Yazmin Camargo RN

## 2024-12-19 NOTE — TELEPHONE ENCOUNTER
Reason for call:   Notes: Patient has some more questions about the medications she was told to stop taking on 12/25/24.

## 2024-12-20 ENCOUNTER — SPECIALTY PHARMACY (OUTPATIENT)
Dept: PHARMACY | Facility: CLINIC | Age: 29
End: 2024-12-20

## 2025-01-02 ENCOUNTER — TELEPHONE (OUTPATIENT)
Dept: ENDOCRINOLOGY | Facility: CLINIC | Age: 30
End: 2025-01-02
Payer: COMMERCIAL

## 2025-01-02 DIAGNOSIS — N97.9 FEMALE INFERTILITY: ICD-10-CM

## 2025-01-02 RX ORDER — METFORMIN HYDROCHLORIDE 500 MG/1
1000 TABLET, EXTENDED RELEASE ORAL
Qty: 180 TABLET | Refills: 0 | Status: SHIPPED | OUTPATIENT
Start: 2025-01-02 | End: 2025-04-02

## 2025-01-02 NOTE — TELEPHONE ENCOUNTER
Telephone call returned to patient, patient states she does not see her new OB until next week and is in need of new Metformin prescription; that her insurance will only cover a 90 day supply. This RN to pend and send to NPOD to patients requested local pharmacy. Patient agreeable and states no further questions.     01/02/25 at 11:20 AM - Yazmin Camargo RN

## 2025-01-02 NOTE — TELEPHONE ENCOUNTER
"Caller: wants to speak to nurse Arce  Reason for call: Medication request  Medication requested:  Metformin 500 mg \"90 day supply\"  Pharmacy: [facility] [address]  Pt would like a call back  "

## 2025-01-07 PROBLEM — Z34.90 ENCOUNTER FOR PREGNANCY RELATED EXAMINATION, ANTEPARTUM: Status: RESOLVED | Noted: 2025-01-07 | Resolved: 2025-01-07

## 2025-01-07 PROBLEM — O09.819 PREGNANCY RESULTING FROM ASSISTED REPRODUCTIVE TECHNOLOGY, ANTEPARTUM (HHS-HCC): Status: ACTIVE | Noted: 2025-01-07

## 2025-01-07 PROBLEM — Z34.90 ENCOUNTER FOR PREGNANCY RELATED EXAMINATION, ANTEPARTUM: Status: ACTIVE | Noted: 2025-01-07

## 2025-01-07 PROBLEM — Z3A.08 8 WEEKS GESTATION OF PREGNANCY (HHS-HCC): Status: RESOLVED | Noted: 2024-12-10 | Resolved: 2025-01-07

## 2025-01-07 PROBLEM — N97.9 FEMALE INFERTILITY: Status: RESOLVED | Noted: 2024-12-10 | Resolved: 2025-01-07

## 2025-01-07 PROBLEM — N96 RECURRENT PREGNANCY LOSS WITHOUT CURRENT PREGNANCY: Status: RESOLVED | Noted: 2024-12-10 | Resolved: 2025-01-07

## 2025-01-08 ENCOUNTER — APPOINTMENT (OUTPATIENT)
Dept: OBSTETRICS AND GYNECOLOGY | Facility: CLINIC | Age: 30
End: 2025-01-08
Payer: COMMERCIAL

## 2025-01-08 ENCOUNTER — HOSPITAL ENCOUNTER (OUTPATIENT)
Dept: RADIOLOGY | Facility: CLINIC | Age: 30
Discharge: HOME | End: 2025-01-08
Payer: COMMERCIAL

## 2025-01-08 VITALS — SYSTOLIC BLOOD PRESSURE: 107 MMHG | BODY MASS INDEX: 27.76 KG/M2 | DIASTOLIC BLOOD PRESSURE: 74 MMHG | WEIGHT: 172 LBS

## 2025-01-08 DIAGNOSIS — D64.9 MILD ANEMIA: ICD-10-CM

## 2025-01-08 DIAGNOSIS — Z34.90 ENCOUNTER FOR PREGNANCY RELATED EXAMINATION, ANTEPARTUM: ICD-10-CM

## 2025-01-08 DIAGNOSIS — E03.8 SUBCLINICAL HYPOTHYROIDISM: ICD-10-CM

## 2025-01-08 DIAGNOSIS — Z3A.08 8 WEEKS GESTATION OF PREGNANCY (HHS-HCC): Primary | ICD-10-CM

## 2025-01-08 DIAGNOSIS — O09.819 PREGNANCY RESULTING FROM ASSISTED REPRODUCTIVE TECHNOLOGY, ANTEPARTUM (HHS-HCC): ICD-10-CM

## 2025-01-08 PROCEDURE — 0501F PRENATAL FLOW SHEET: CPT | Performed by: OBSTETRICS & GYNECOLOGY

## 2025-01-08 PROCEDURE — 76813 OB US NUCHAL MEAS 1 GEST: CPT

## 2025-01-08 NOTE — PROGRESS NOTES
Ob Follow-up    SUBJECTIVE      HPI: Delia Hernández is a 29 y.o.  at 12w6d here for RPNV.  She denies vaginal bleeding, leakage of fluid, decreased fetal movements, and contractions.       OBJECTIVE  Visit Vitals  /74   Wt 78 kg (172 lb)   LMP 10/06/2024   BMI 27.76 kg/m²   OB Status Pregnant   Smoking Status Never   BSA 1.91 m²      See OB flowsheet      ASSESSMENT & PLAN    Delia Hernández is a 29 y.o.  at 12w6d here for the following concerns we addressed today:    Problem List Items Addressed This Visit          Ob-Gyn Problems    Pregnancy resulting from assisted reproductive technology, antepartum ([facility])    Overview     Growth US   30w  36w  Weekly NSTs at 36w         Encounter for pregnancy related examination, antepartum    Overview       [x] Accepts Blood Products: Yes  [x] Initial BMI: 28  [x] Prenatal Labs: Hgb id possible alpha thalassemia  [x] Last pap: 2021 Negative, requests to defer to until after delivery  [x] Genetic Screening:  Had PGT-A, declines cfDNA screen  [] Fetal Sex:   [x] Baby ASA: Yes  [x] Pregnancy dated by: IVF    [] Anatomy US:   [] 1hr GCT at 24-28wks:  [x] Rhogam: O Positive  [] Tdap Vaccine:  [] RSV (32-36 wks Sep-):   [x] Flu Vaccine: Declines    [] GBS:  [] Labor preferences:  [] 39 weeks discussion of IOL vs. Expectant management:  [] Mode of delivery ( anticipated ):   [] Breastfeeding:  [] Postpartum Birth control method:                  Relevant Orders    US [facility] OB imaging order    RESOLVED: 8 weeks gestation of pregnancy ([facility]) - Primary    Overview     Desired provider in labor: [] CNM  [] Physician  [] Blood Products: [] Yes, accepts [] No, needs counseling  [x] Initial BMI: 28.42   [] Prenatal Labs:   [] Cervical Cancer Screening up to date  [x] Rh status: O+  [] Genetic Screening:    [] NT US: (11-13 wks)  [] Baby ASA (if indicated):  [x] Pregnancy dated by: IVF embryo transfer    [] Anatomy US: (19-20 wks)  [] Federal Sterilization  consent signed (if indicated):  [] 1hr GCT at 24-28wks:  [] Rhogam (if indicated):   [] Fetal Surveillance (if indicated):  [] Tdap (27-32 wks, may be given up to 36 wks if initial window missed):   [] RSV (32-36 wks) (Sept. to end of Jan):   [] Flu Vaccine:    [] Breastfeeding:  [] Postpartum Birth control method:   [] GBS at 36 - 37 wks:  [] 39 weeks discussion of IOL vs. Expectant management:  [] Mode of delivery ( anticipated ):             Other    Subclinical hypothyroidism    Overview     Check TSH qtrimester  Synthroid  1st Tri .44  2nd Tri  3rd Tri         Mild anemia    Overview     Mild AYAN vs. Alpha thalassemia              Increase BASA to 162mg and stop metformin  RTC in 4 weeks      Husam Jimenez MD

## 2025-02-03 NOTE — PROGRESS NOTES
Ob Follow-up    SUBJECTIVE      HPI: Delia Hernández is a 29 y.o.  at 16w6d here for RPNV.  She denies vaginal bleeding, leakage of fluid, decreased fetal movements, and contractions.       OBJECTIVE  Visit Vitals  /73   Wt 80.3 kg (177 lb)   LMP 10/06/2024   BMI 28.57 kg/m²   OB Status Pregnant   Smoking Status Never   BSA 1.93 m²      See OB flowsheet      ASSESSMENT & PLAN    Delia Hernández is a 29 y.o.  at 16w6d here for the following concerns we addressed today:    Problem List Items Addressed This Visit          Ob-Gyn Problems    Pregnancy resulting from assisted reproductive technology, antepartum ([facility]) - Primary    Overview     Growth US   30w  36w  Weekly NSTs at 36w         Encounter for pregnancy related examination, antepartum    Overview       [x] Accepts Blood Products: Yes  [x] Initial BMI: 28  [x] Prenatal Labs: Hgb id possible alpha thalassemia  [x] Last pap: 2021 Negative, requests to defer to until after delivery  [x] Genetic Screening:  Had PGT-A, declines cfDNA screen, NT subjectively normal  [x] Fetal Sex: It's a surprise!  [x] Baby ASA: Yes  [x] Pregnancy dated by: IVF    [] Anatomy US:   [] 1hr GCT at 24-28wks:  [x] Rhogam: O Positive  [] Tdap Vaccine:  [] RSV (32-36 wks Sep-):   [x] Flu Vaccine: Declines    [] GBS:  [] Labor preferences:  [] 39 weeks discussion of IOL vs. Expectant management:  [] Mode of delivery ( anticipated ):   [] Breastfeeding:  [] Postpartum Birth control method:                     Other    Subclinical hypothyroidism    Overview     Check TSH qtrimester  Synthroid  1st Tri .44  2nd Tri  3rd Tri         Mild anemia    Overview     Mild AYAN vs. Alpha thalassemia            Anatomy US scheduled  TSH next visit  RTC in 4 weeks      Husam Jimenez MD

## 2025-02-05 ENCOUNTER — APPOINTMENT (OUTPATIENT)
Dept: OBSTETRICS AND GYNECOLOGY | Facility: CLINIC | Age: 30
End: 2025-02-05
Payer: COMMERCIAL

## 2025-02-05 VITALS — WEIGHT: 177 LBS | SYSTOLIC BLOOD PRESSURE: 107 MMHG | BODY MASS INDEX: 28.57 KG/M2 | DIASTOLIC BLOOD PRESSURE: 73 MMHG

## 2025-02-05 DIAGNOSIS — E03.8 SUBCLINICAL HYPOTHYROIDISM: ICD-10-CM

## 2025-02-05 DIAGNOSIS — O09.819 PREGNANCY RESULTING FROM ASSISTED REPRODUCTIVE TECHNOLOGY, ANTEPARTUM (HHS-HCC): Primary | ICD-10-CM

## 2025-02-05 DIAGNOSIS — D64.9 MILD ANEMIA: ICD-10-CM

## 2025-02-05 DIAGNOSIS — Z34.90 ENCOUNTER FOR PREGNANCY RELATED EXAMINATION, ANTEPARTUM: ICD-10-CM

## 2025-02-05 PROCEDURE — 0501F PRENATAL FLOW SHEET: CPT | Performed by: OBSTETRICS & GYNECOLOGY

## 2025-02-20 ENCOUNTER — HOSPITAL ENCOUNTER (OUTPATIENT)
Dept: RADIOLOGY | Facility: CLINIC | Age: 30
Discharge: HOME | End: 2025-02-20
Payer: COMMERCIAL

## 2025-02-20 DIAGNOSIS — Z34.90 ENCOUNTER FOR PREGNANCY RELATED EXAMINATION, ANTEPARTUM: ICD-10-CM

## 2025-02-20 PROCEDURE — 76811 OB US DETAILED SNGL FETUS: CPT

## 2025-03-05 NOTE — PROGRESS NOTES
Ob Follow-up    SUBJECTIVE      HPI: Delia Hernández is a 29 y.o.  at 21w here for RPNV.  She denies vaginal bleeding, leakage of fluid, decreased fetal movements, and contractions.  Reports some lightheadedness at night. Mild cramping, but hasn't had much to drink today.         OBJECTIVE  Visit Vitals  /68   Wt 82.1 kg (181 lb)   LMP 10/06/2024   BMI 29.21 kg/m²   OB Status Pregnant   Smoking Status Never   BSA 1.96 m²      See OB flowsheet      ASSESSMENT & PLAN    Delia Hernández is a 29 y.o.  at 21w here for the following concerns we addressed today:    Problem List Items Addressed This Visit          Ob-Gyn Problems    Pregnancy resulting from assisted reproductive technology, antepartum ([facility])    Overview     Growth US   30w  36w  Weekly NSTs at 36w         Encounter for pregnancy related examination, antepartum - Primary    Overview       [x] Accepts Blood Products: Yes  [x] Initial BMI: 28  [x] Prenatal Labs: Hgb id possible alpha thalassemia  [x] Last pap: 2021 Negative, requests to defer to until after delivery  [x] Genetic Screening:  Had PGT-A, declines cfDNA screen, NT subjectively normal  [x] Fetal Sex: It's a surprise!  [x] Baby ASA: Yes  [x] Pregnancy dated by: IVF    [x] Anatomy US: Normal  [] 1hr GCT at 24-28wks:  [x] Rhogam: O Positive  [] Tdap Vaccine:  [] RSV (32-36 wks Sep-):   [x] Flu Vaccine: Declines    [] GBS:  [] Labor preferences:  [] 39 weeks discussion of IOL vs. Expectant management:  [] Mode of delivery ( anticipated ):   [] Breastfeeding:  [] Postpartum Birth control method:                  Relevant Orders    Glucose, 1 Hour Screen, Pregnancy    Syphilis Screen with Reflex    TSH with reflex to Free T4 if abnormal    CBC Anemia Panel With Reflex,Pregnancy       Other    Subclinical hypothyroidism    Overview     Check TSH qtrimester  Synthroid  1st Tri .44  2nd Tri  3rd Tri         Mild anemia    Overview     Mild AYAN vs. Alpha thalassemia           Lightheadedness - change position slowly, VARGAS hose, increase fluid intake  Mild cramping - check UA, reviewed precautions, increase fluid intake  TSH with GCT next visit  Gender reveal next Saturday  RTC in 4 weeks      Husam Jimenez MD

## 2025-03-06 ENCOUNTER — APPOINTMENT (OUTPATIENT)
Dept: OBSTETRICS AND GYNECOLOGY | Facility: CLINIC | Age: 30
End: 2025-03-06
Payer: COMMERCIAL

## 2025-03-06 VITALS — BODY MASS INDEX: 29.21 KG/M2 | DIASTOLIC BLOOD PRESSURE: 68 MMHG | SYSTOLIC BLOOD PRESSURE: 100 MMHG | WEIGHT: 181 LBS

## 2025-03-06 DIAGNOSIS — O09.819 PREGNANCY RESULTING FROM ASSISTED REPRODUCTIVE TECHNOLOGY, ANTEPARTUM (HHS-HCC): ICD-10-CM

## 2025-03-06 DIAGNOSIS — Z34.90 ENCOUNTER FOR PREGNANCY RELATED EXAMINATION, ANTEPARTUM: Primary | ICD-10-CM

## 2025-03-06 DIAGNOSIS — D64.9 MILD ANEMIA: ICD-10-CM

## 2025-03-06 DIAGNOSIS — E03.8 SUBCLINICAL HYPOTHYROIDISM: ICD-10-CM

## 2025-03-06 PROCEDURE — 0501F PRENATAL FLOW SHEET: CPT | Performed by: OBSTETRICS & GYNECOLOGY

## 2025-03-08 LAB — BACTERIA UR CULT: NORMAL

## 2025-04-02 ENCOUNTER — APPOINTMENT (OUTPATIENT)
Dept: OBSTETRICS AND GYNECOLOGY | Facility: CLINIC | Age: 30
End: 2025-04-02
Payer: COMMERCIAL

## 2025-04-03 ENCOUNTER — APPOINTMENT (OUTPATIENT)
Dept: OBSTETRICS AND GYNECOLOGY | Facility: CLINIC | Age: 30
End: 2025-04-03
Payer: COMMERCIAL

## 2025-04-03 VITALS — SYSTOLIC BLOOD PRESSURE: 102 MMHG | WEIGHT: 192 LBS | BODY MASS INDEX: 30.99 KG/M2 | DIASTOLIC BLOOD PRESSURE: 70 MMHG

## 2025-04-03 DIAGNOSIS — E03.8 SUBCLINICAL HYPOTHYROIDISM: ICD-10-CM

## 2025-04-03 DIAGNOSIS — O09.819 PREGNANCY RESULTING FROM ASSISTED REPRODUCTIVE TECHNOLOGY, ANTEPARTUM (HHS-HCC): ICD-10-CM

## 2025-04-03 DIAGNOSIS — Z3A.25 25 WEEKS GESTATION OF PREGNANCY (HHS-HCC): ICD-10-CM

## 2025-04-03 DIAGNOSIS — Z34.90 ENCOUNTER FOR PREGNANCY RELATED EXAMINATION, ANTEPARTUM: ICD-10-CM

## 2025-04-03 DIAGNOSIS — Z34.90 ENCOUNTER FOR PREGNANCY RELATED EXAMINATION, ANTEPARTUM: Primary | ICD-10-CM

## 2025-04-03 PROCEDURE — 85027 COMPLETE CBC AUTOMATED: CPT

## 2025-04-03 PROCEDURE — 0501F PRENATAL FLOW SHEET: CPT | Performed by: OBSTETRICS & GYNECOLOGY

## 2025-04-03 ASSESSMENT — ENCOUNTER SYMPTOMS
EYES NEGATIVE: 0
HEMATOLOGIC/LYMPHATIC NEGATIVE: 0
ENDOCRINE NEGATIVE: 0
PSYCHIATRIC NEGATIVE: 0
MUSCULOSKELETAL NEGATIVE: 0
NEUROLOGICAL NEGATIVE: 0
CARDIOVASCULAR NEGATIVE: 0
GASTROINTESTINAL NEGATIVE: 0
ALLERGIC/IMMUNOLOGIC NEGATIVE: 0
CONSTITUTIONAL NEGATIVE: 0
RESPIRATORY NEGATIVE: 0

## 2025-04-03 NOTE — PROGRESS NOTES
Pt called in, she needs her blood work for her glucose today. As well as her thyroid. Orders are in but need to say lab collect and currently they are clinic collect.  Tana Thornton LPN

## 2025-04-03 NOTE — PROGRESS NOTES
Ob Visit  25     SUBJECTIVE    HPI: Delia Hernández is a 29 y.o.  at 25w0d here for RPNV.  She has no contractions, no bleeding, or no LOF. Reports normal fetal movement. Patient reports doing second trimester labs today. Getting bloody noses several times/week.      OBJECTIVE  Visit Vitals  /70   Wt 87.1 kg (192 lb)   LMP 10/06/2024   BMI 30.99 kg/m²   OB Status Pregnant   Smoking Status Never   BSA 2.01 m²        ASSESSMENT & PLAN    Delia Hernández is a 29 y.o.  at 25w0d here for the following concerns we addressed today:    Problem List Items Addressed This Visit       Pregnancy resulting from assisted reproductive technology, antepartum ([facility])    Overview     Growth US   30w  36w  Weekly NSTs at 36w         Encounter for pregnancy related examination, antepartum - Primary    Overview       [x] Accepts Blood Products: Yes  [x] Initial BMI: 28  [x] Prenatal Labs: Hgb id possible alpha thalassemia  [x] Last pap: 2021 Negative, requests to defer to until after delivery  [x] Genetic Screening:  Had PGT-A, declines cfDNA screen, NT subjectively normal  [x] Fetal Sex: It's a surprise!  [x] Baby ASA: Yes  [x] Pregnancy dated by: IVF    [x] Anatomy US: Normal  [] 1hr GCT at 24-28wks:  [x] Rhogam: O Positive  [] Tdap Vaccine:  [] RSV (32-36 wks Sep-):   [x] Flu Vaccine: Declines    [] GBS:  [] Labor preferences:  [] 39 weeks discussion of IOL vs. Expectant management:  [] Mode of delivery ( anticipated ):   [] Breastfeeding:  [] Postpartum Birth control method:                   Other Visit Diagnoses       25 weeks gestation of pregnancy ([facility])                RTC in 3-4 weeks      Maddie Galeas MD

## 2025-04-04 ENCOUNTER — APPOINTMENT (OUTPATIENT)
Dept: LAB | Facility: HOSPITAL | Age: 30
End: 2025-04-04
Payer: COMMERCIAL

## 2025-04-04 LAB
ERYTHROCYTE [DISTWIDTH] IN BLOOD BY AUTOMATED COUNT: 14.3 % (ref 11.5–14.5)
HCT VFR BLD AUTO: 34.3 % (ref 36–46)
HGB BLD-MCNC: 11.3 G/DL (ref 12–16)
MCH RBC QN AUTO: 27.4 PG (ref 26–34)
MCHC RBC AUTO-ENTMCNC: 32.9 G/DL (ref 32–36)
MCV RBC AUTO: 83 FL (ref 80–100)
NRBC BLD-RTO: 0 /100 WBCS (ref 0–0)
PLATELET # BLD AUTO: 265 X10*3/UL (ref 150–450)
RBC # BLD AUTO: 4.12 X10*6/UL (ref 4–5.2)
REFLEX ADDED, ANEMIA PANEL: NORMAL
WBC # BLD AUTO: 7.8 X10*3/UL (ref 4.4–11.3)

## 2025-04-07 ENCOUNTER — TELEPHONE (OUTPATIENT)
Dept: OBSTETRICS AND GYNECOLOGY | Facility: CLINIC | Age: 30
End: 2025-04-07
Payer: COMMERCIAL

## 2025-04-07 DIAGNOSIS — R73.9 BLOOD GLUCOSE ELEVATED: ICD-10-CM

## 2025-04-07 NOTE — TELEPHONE ENCOUNTER
Patient verified by name and .   Informed 1 hour glucose screening is elevated and she will need to complete a 3 hour glucose test.   Patient educated that she will need to fast meaning nothing to eat or drink for at least 8 hours prior to the test except water.   The test will take approximately 3 hours requiring 4 blood draws and she must stay at the lab during that time.   Patient encouraged to complete testing as soon as possible.   Patient verbalized understanding and denies any questions or concerns.    GENEVA Riddle RN    ----- Message from Hsuam Jimenez sent at 2025  8:43 AM EDT -----  Please notify patient and schedule 3 hour GTT  ----- Message -----  From: Lab, Background User  Sent: 2025  12:40 AM EDT  To: Husam Jimenez MD

## 2025-04-08 LAB
GLUCOSE 1H P 50 G GLC PO SERPL-MCNC: 145 MG/DL
T PALLIDUM AB SER QL IA: NEGATIVE
TSH SERPL-ACNC: 0.92 MIU/L

## 2025-04-13 LAB
GLUCOSE P FAST SERPL-MCNC: 55 MG/DL (ref 65–99)
GLUCOSE SP1 P CHAL SERPL-MCNC: 199 MG/DL
GLUCOSE SP2 P CHAL SERPL-MCNC: 188 MG/DL
GLUCOSE SP3 P CHAL SERPL-MCNC: 56 MG/DL
SERVICE CMNT-IMP: ABNORMAL
SPECIMEN DRAWN SERPL: ABNORMAL

## 2025-04-15 ENCOUNTER — TELEPHONE (OUTPATIENT)
Dept: OBSTETRICS AND GYNECOLOGY | Facility: CLINIC | Age: 30
End: 2025-04-15
Payer: COMMERCIAL

## 2025-04-15 NOTE — TELEPHONE ENCOUNTER
Called [facility] to see if patient took glucose load prior to lab collection  Identified patient by name and   [facility] states they are unable to see this and do not know which lab patient went to, typically patient's are given 75gm glucose drink prior to testing for pregnancy GDM    RN called patient to confirm if this was done  Identified by name and   Patient confirmed she had glucose load prior to labs  Stated it was 75gm which is consistent with lab's statement  RN will forward to provider for next steps and final results for patient.  Patient verbalized understanding, all questions/concerns addressed at this time.    GENEVA Riddle RN    ----- Message from Husam Jimenez sent at 4/15/2025 12:27 PM EDT -----  This 3 hour GTT was ordered incorrectly(non pregnant).  Please call the lab to confirm that she received 100gm glucose load.  Thanks.  ----- Message -----  From: [facility] Results In  Sent: 2025   9:19 AM EDT  To: Husam Jimenez MD

## 2025-04-16 ENCOUNTER — TELEPHONE (OUTPATIENT)
Dept: OBSTETRICS AND GYNECOLOGY | Facility: CLINIC | Age: 30
End: 2025-04-16
Payer: COMMERCIAL

## 2025-04-16 DIAGNOSIS — R73.9 BLOOD GLUCOSE ELEVATED: ICD-10-CM

## 2025-04-16 RX ORDER — ISOPROPYL ALCOHOL 70 ML/100ML
SWAB TOPICAL
Qty: 40 EACH | Refills: 0 | Status: SHIPPED | OUTPATIENT
Start: 2025-04-16

## 2025-04-16 RX ORDER — DEXTROSE 4 G
TABLET,CHEWABLE ORAL
Qty: 1 EACH | Refills: 0 | Status: SHIPPED | OUTPATIENT
Start: 2025-04-16

## 2025-04-16 RX ORDER — LANCETS
EACH MISCELLANEOUS
Qty: 50 EACH | Refills: 0 | Status: SHIPPED | OUTPATIENT
Start: 2025-04-16

## 2025-04-16 RX ORDER — IBUPROFEN 200 MG
CAPSULE ORAL
Qty: 150 EACH | Refills: 3 | Status: SHIPPED | OUTPATIENT
Start: 2025-04-16

## 2025-04-16 NOTE — TELEPHONE ENCOUNTER
Called patient to discuss concerns  Identified by name and    Patient was recording tests at home to double check, she did a fasting was 85 that morning, then went to clinic for lab and was 55  Is she really GDM or not? Often screens at home, and her levels are within normal limits, did this for 4 days before test and all results were normal  Will send prior results in [facility], want to see if she can do 1-2 weeks of monitoring and then discuss at May 1st appointment when they see her next for PNV  RN will run this by provider to see if this is appropriate and will follow up with patient and spouse.    Moira Restrepo, SUSIN RN

## 2025-04-17 ENCOUNTER — DOCUMENTATION (OUTPATIENT)
Dept: OBSTETRICS AND GYNECOLOGY | Facility: CLINIC | Age: 30
End: 2025-04-17
Payer: COMMERCIAL

## 2025-04-17 NOTE — PROGRESS NOTES
The patient attended the Gestational Diabetes Self-Management VIRTUAL Group Education Program    Overview of Diabetes    Type 1    Type 2    Gestational       -Risks to baby and Mom  Self-monitoring     Tips for Testing/troubleshooting glucometers    Goal range for blood sugars (<95 fasting, <140 1 hour postprandial for all meals)    Record Keeping    Blood Sugar Line    Blood Sugar Log Sheets - provided  Managing Diabetes     Physical Activity - recommendation is about 150 minutes per week    Medication        Oral/insulin overview  Additional  testing     NST/BPP/Growth ultrasound - general overview  Postpartum     Expectations in the hospital    Follow up - recommend fasting, 75g OGGT, 6-8 weeks after delivery     50% change of developing GDM in another pregnancy    50% chance of developing T2DM within next 10 years    Up to 30% of patients will have pre-diabetes or T2DM following delivery       Breastfeeding is strongly encouraged   Special considerations, self-management    Hypoglycemia    Hyperglycemia    Sick Day Rules  Resilience training/stress management    Engage your senses    Gratitude practice  Emotional support     “Baby Blues”    Postpartum Depression       When to call for help  Nutrition planning     Identify carbohydrates, serving size, carbohydrate counting    “Free Foods” - very low carbohydrate options    Label Reading    Personalized Meal Plan     3 meals + 3 snacks = approximately 175g carbohydrates/day    Follow up for 1:1 Registered Dietician consult       [phone] to schedule appointment    Individual consult with Maternal Fetal Medicine provider is scheduled.

## 2025-04-30 PROBLEM — R73.9 BLOOD GLUCOSE ELEVATED: Status: ACTIVE | Noted: 2025-04-30

## 2025-05-01 ENCOUNTER — APPOINTMENT (OUTPATIENT)
Dept: OBSTETRICS AND GYNECOLOGY | Facility: CLINIC | Age: 30
End: 2025-05-01
Payer: COMMERCIAL

## 2025-05-01 VITALS — SYSTOLIC BLOOD PRESSURE: 107 MMHG | WEIGHT: 190 LBS | DIASTOLIC BLOOD PRESSURE: 73 MMHG | BODY MASS INDEX: 30.67 KG/M2

## 2025-05-01 DIAGNOSIS — Z34.90 ENCOUNTER FOR PREGNANCY RELATED EXAMINATION, ANTEPARTUM: ICD-10-CM

## 2025-05-01 DIAGNOSIS — O09.819 PREGNANCY RESULTING FROM ASSISTED REPRODUCTIVE TECHNOLOGY, ANTEPARTUM (HHS-HCC): Primary | ICD-10-CM

## 2025-05-01 DIAGNOSIS — R73.9 BLOOD GLUCOSE ELEVATED: ICD-10-CM

## 2025-05-01 DIAGNOSIS — D64.9 MILD ANEMIA: ICD-10-CM

## 2025-05-01 DIAGNOSIS — E03.8 SUBCLINICAL HYPOTHYROIDISM: ICD-10-CM

## 2025-05-01 PROCEDURE — 0501F PRENATAL FLOW SHEET: CPT | Performed by: OBSTETRICS & GYNECOLOGY

## 2025-05-01 ASSESSMENT — EDINBURGH POSTNATAL DEPRESSION SCALE (EPDS)
I HAVE BEEN ANXIOUS OR WORRIED FOR NO GOOD REASON: NO, NOT AT ALL
THINGS HAVE BEEN GETTING ON TOP OF ME: NO, I HAVE BEEN COPING AS WELL AS EVER
I HAVE FELT SAD OR MISERABLE: NO, NOT AT ALL
I HAVE BEEN SO UNHAPPY THAT I HAVE HAD DIFFICULTY SLEEPING: NOT AT ALL
I HAVE BEEN ABLE TO LAUGH AND SEE THE FUNNY SIDE OF THINGS: AS MUCH AS I ALWAYS COULD
I HAVE LOOKED FORWARD WITH ENJOYMENT TO THINGS: AS MUCH AS I EVER DID
I HAVE FELT SCARED OR PANICKY FOR NO GOOD REASON: NO, NOT AT ALL
THE THOUGHT OF HARMING MYSELF HAS OCCURRED TO ME: NEVER
TOTAL SCORE: 0
I HAVE BLAMED MYSELF UNNECESSARILY WHEN THINGS WENT WRONG: NO, NEVER
I HAVE BEEN SO UNHAPPY THAT I HAVE BEEN CRYING: NO, NEVER

## 2025-05-01 NOTE — PROGRESS NOTES
Ob Follow-up    SUBJECTIVE      HPI: Delia Hernández is a 29 y.o.  at 29w here for RPNV.  She denies vaginal bleeding, leakage of fluid, decreased fetal movements, and contractions.         OBJECTIVE  Visit Vitals  /73   Wt 86.2 kg (190 lb)   LMP 10/06/2024   BMI 30.67 kg/m²   OB Status Pregnant   Smoking Status Never   BSA 2 m²      See OB flowsheet      ASSESSMENT & PLAN    Delia Hernández is a 29 y.o.  at 29w here for the following concerns we addressed today:    Problem List Items Addressed This Visit          Ob-Gyn Problems    Pregnancy resulting from assisted reproductive technology, antepartum ([facility]) - Primary    Overview   Growth US   30w  36w  Weekly NSTs at 36w         Encounter for pregnancy related examination, antepartum    Overview     [x] Accepts Blood Products: Yes  [x] Initial BMI: 28  [x] Prenatal Labs: Hgb id possible alpha thalassemia  [x] Last pap: 2021 Negative, requests to defer to until after delivery  [x] Genetic Screening:  Had PGT-A, declines cfDNA screen, NT subjectively normal  [x] Fetal Sex: It's a girl!  [x] Baby ASA: Yes  [x] Pregnancy dated by: IVF    [x] Anatomy US: Normal  [x] 1hr GCT at 24-28wks: Abnormal, schedule 3hr  -See notes, 3 weeks of monitoring wnl  [x] Rhogam: O Positive  [] Tdap Vaccine:  [] RSV (32-36 wks Sep-):   [x] Flu Vaccine: Declines    [] GBS:  [] Labor preferences:  [] 39 weeks discussion of IOL vs. Expectant management:  [] Mode of delivery ( anticipated ):   [] Breastfeeding:  [] Postpartum Birth control method:                  Blood glucose elevated    Overview   Abnormal 1hr GCT  Labor error during 3 hr GTT and only 75gm glucose load given  Patient requested monitoring Blood glucose for 3 weeks:  FBG All < 95  1hr postprandial vast majority <140              Other    Subclinical hypothyroidism    Overview   Check TSH qtrimester  Synthroid 100mcg daily  1st Tri .44  2nd Tri .96   3rd Tri         Mild anemia    Overview    Mild AYAN vs. Alpha thalassemia          TSH next visit, consider tDap vaccine next visit  RTC in 4 weeks      Husam Jimenez MD

## 2025-05-06 ENCOUNTER — APPOINTMENT (OUTPATIENT)
Dept: MATERNAL FETAL MEDICINE | Facility: CLINIC | Age: 30
End: 2025-05-06
Payer: COMMERCIAL

## 2025-05-14 ENCOUNTER — HOSPITAL ENCOUNTER (OUTPATIENT)
Dept: RADIOLOGY | Facility: CLINIC | Age: 30
Discharge: HOME | End: 2025-05-14
Payer: COMMERCIAL

## 2025-05-14 DIAGNOSIS — O99.280 HYPOTHYROIDISM IN PREGNANCY, ANTEPARTUM (HHS-HCC): ICD-10-CM

## 2025-05-14 DIAGNOSIS — E03.9 HYPOTHYROIDISM IN PREGNANCY, ANTEPARTUM (HHS-HCC): ICD-10-CM

## 2025-05-14 DIAGNOSIS — O24.419 GESTATIONAL DIABETES: ICD-10-CM

## 2025-05-14 DIAGNOSIS — O09.813 PREGNANCY RESULTING FROM IN VITRO FERTILIZATION IN THIRD TRIMESTER (HHS-HCC): ICD-10-CM

## 2025-05-14 DIAGNOSIS — Z34.90 ENCOUNTER FOR PREGNANCY RELATED EXAMINATION, ANTEPARTUM: ICD-10-CM

## 2025-05-14 PROCEDURE — 76819 FETAL BIOPHYS PROFIL W/O NST: CPT

## 2025-05-14 PROCEDURE — 76816 OB US FOLLOW-UP PER FETUS: CPT

## 2025-05-14 NOTE — PROGRESS NOTES
Ob Follow-up    SUBJECTIVE      HPI: Delia Hernández is a 29 y.o.  at 31w here for RPNV.  She denies vaginal bleeding, leakage of fluid, decreased fetal movements, and contractions.         OBJECTIVE  Visit Vitals  BP 99/68   Wt 88 kg (194 lb)   LMP 10/06/2024   BMI 31.31 kg/m²   OB Status Pregnant   Smoking Status Never   BSA 2.02 m²      See OB flowsheet      ASSESSMENT & PLAN    Delia Hernández is a 29 y.o.  at 31w here for the following concerns we addressed today:    Problem List Items Addressed This Visit          Ob-Gyn Problems    Pregnancy resulting from assisted reproductive technology, antepartum ([facility]) - Primary    Overview   Growth US   30w EFW 41%  36w  Weekly NSTs at 36w         Encounter for pregnancy related examination, antepartum    Overview     [x] Accepts Blood Products: Yes  [x] Initial BMI: 28  [x] Prenatal Labs: Hgb id possible alpha thalassemia  [x] Last pap: 2021 Negative, requests to defer to until after delivery  [x] Genetic Screening:  Had PGT-A, declines cfDNA screen, NT subjectively normal  [x] Fetal Sex: It's a girl!  [x] Baby ASA: Yes  [x] Pregnancy dated by: IVF    [x] Anatomy US: Normal  [x] 1hr GCT at 24-28wks: Abnormal, schedule 3hr  -See notes, 3 weeks of monitoring wnl  [x] Rhogam: O Positive  [] Tdap Vaccine:  [] RSV (32-36 wks Sep-):   [x] Flu Vaccine: Declines    [] GBS:  [] Labor preferences:  [] 39 weeks discussion of IOL vs. Expectant management:  [x] Mode of delivery: Vaginal   [x] Breastfeeding: Yes  [] Postpartum Birth control method:                  Blood glucose elevated    Overview   Abnormal 1hr GCT  Labor error during 3 hr GTT and only 75gm glucose load given  Patient requested monitoring Blood glucose for 3 weeks:  FBG All < 95  1hr postprandial vast majority <140              Other    Subclinical hypothyroidism    Overview   Check TSH qtrimester  Synthroid 100mcg daily  1st Tri .44  2nd Tri .96   3rd Tri         Relevant Orders    TSH  with reflex to Free T4 if abnormal    Mild anemia    Overview   Mild AYAN vs. Alpha thalassemia          TSH today  tDap next visit  RTC in 2 weeks      Husam Jimenez MD

## 2025-05-15 ENCOUNTER — APPOINTMENT (OUTPATIENT)
Dept: OBSTETRICS AND GYNECOLOGY | Facility: CLINIC | Age: 30
End: 2025-05-15
Payer: COMMERCIAL

## 2025-05-15 VITALS — BODY MASS INDEX: 31.31 KG/M2 | DIASTOLIC BLOOD PRESSURE: 68 MMHG | WEIGHT: 194 LBS | SYSTOLIC BLOOD PRESSURE: 99 MMHG

## 2025-05-15 DIAGNOSIS — R73.9 BLOOD GLUCOSE ELEVATED: ICD-10-CM

## 2025-05-15 DIAGNOSIS — E03.8 SUBCLINICAL HYPOTHYROIDISM: ICD-10-CM

## 2025-05-15 DIAGNOSIS — Z34.90 ENCOUNTER FOR PREGNANCY RELATED EXAMINATION, ANTEPARTUM: ICD-10-CM

## 2025-05-15 DIAGNOSIS — D64.9 MILD ANEMIA: ICD-10-CM

## 2025-05-15 DIAGNOSIS — O09.819 PREGNANCY RESULTING FROM ASSISTED REPRODUCTIVE TECHNOLOGY, ANTEPARTUM (HHS-HCC): Primary | ICD-10-CM

## 2025-05-15 PROCEDURE — 0501F PRENATAL FLOW SHEET: CPT | Performed by: OBSTETRICS & GYNECOLOGY

## 2025-05-16 LAB — TSH SERPL-ACNC: 0.99 MIU/L

## 2025-05-23 ENCOUNTER — PATIENT MESSAGE (OUTPATIENT)
Dept: MATERNAL FETAL MEDICINE | Facility: HOSPITAL | Age: 30
End: 2025-05-23
Payer: COMMERCIAL

## 2025-05-26 NOTE — PROGRESS NOTES
Ob Follow-up    SUBJECTIVE    HPI: Delia Hernández is a 29 y.o.  at 33w here for RPNV.  She denies vaginal bleeding, leakage of fluid, decreased fetal movements, and contractions.         OBJECTIVE  Visit Vitals  BP 94/64   Wt 89.4 kg (197 lb)   LMP 10/06/2024   BMI 31.80 kg/m²   OB Status Pregnant   Smoking Status Never   BSA 2.04 m²      See OB flowsheet      ASSESSMENT & PLAN    Delia Hernández is a 29 y.o.  at 33w here for the following concerns we addressed today:    Problem List Items Addressed This Visit          Ob-Gyn Problems    Pregnancy resulting from assisted reproductive technology, antepartum ([facility]) - Primary    Overview   Growth US   30w EFW 41%  36w  Weekly NSTs at 36w         Encounter for pregnancy related examination, antepartum    Overview     [x] Accepts Blood Products: Yes  [x] Initial BMI: 28  [x] Prenatal Labs: Hgb id possible alpha thalassemia  [x] Last pap: 2021 Negative, requests to defer to until after delivery  [x] Genetic Screening:  Had PGT-A, declines cfDNA screen, NT subjectively normal  [x] Fetal Sex: It's a girl!  [x] Baby ASA: Yes  [x] Pregnancy dated by: IVF    [x] Anatomy US: Normal  [x] 1hr GCT at 24-28wks: Abnormal, schedule 3hr  -See notes, 3 weeks of monitoring wnl  [x] Rhogam: O Positive  [] Tdap Vaccine:  [] RSV (32-36 wks Sep-):   [x] Flu Vaccine: Declines    [] GBS:  [] Labor preferences:  [] 39 weeks discussion of IOL vs. Expectant management:  [x] Mode of delivery: Vaginal   [x] Breastfeeding: Yes  [] Postpartum Birth control method:                  Relevant Orders    Tdap vaccine, age 7 years and older  (BOOSTRIX)    Blood glucose elevated    Overview   Abnormal 1hr GCT  Labor error during 3 hr GTT and only 75gm glucose load given  Patient requested monitoring Blood glucose for 3 weeks:  FBG All < 95  1hr postprandial vast majority <140              Other    Subclinical hypothyroidism    Overview   Check TSH qtrimester  Synthroid 100mcg  daily  1st Tri .44  2nd Tri .96   3rd Tri .99          Mild anemia    Overview   Mild AYAN vs. Alpha thalassemia         Anal fistula    Overview   1/2023 s/p fisulotomy  Op report reviewed, minimal muscle involvement            tDap today  US scheduled at 36w  RTC in 2 weeks      Husam Jimenez MD

## 2025-05-29 ENCOUNTER — APPOINTMENT (OUTPATIENT)
Dept: OBSTETRICS AND GYNECOLOGY | Facility: CLINIC | Age: 30
End: 2025-05-29
Payer: COMMERCIAL

## 2025-05-29 VITALS — SYSTOLIC BLOOD PRESSURE: 94 MMHG | WEIGHT: 197 LBS | BODY MASS INDEX: 31.8 KG/M2 | DIASTOLIC BLOOD PRESSURE: 64 MMHG

## 2025-05-29 DIAGNOSIS — D64.9 MILD ANEMIA: ICD-10-CM

## 2025-05-29 DIAGNOSIS — K60.30 ANAL FISTULA: ICD-10-CM

## 2025-05-29 DIAGNOSIS — E03.8 SUBCLINICAL HYPOTHYROIDISM: ICD-10-CM

## 2025-05-29 DIAGNOSIS — R73.9 BLOOD GLUCOSE ELEVATED: ICD-10-CM

## 2025-05-29 DIAGNOSIS — O09.819 PREGNANCY RESULTING FROM ASSISTED REPRODUCTIVE TECHNOLOGY, ANTEPARTUM (HHS-HCC): Primary | ICD-10-CM

## 2025-05-29 DIAGNOSIS — Z34.90 ENCOUNTER FOR PREGNANCY RELATED EXAMINATION, ANTEPARTUM: ICD-10-CM

## 2025-05-29 PROCEDURE — 90715 TDAP VACCINE 7 YRS/> IM: CPT | Performed by: OBSTETRICS & GYNECOLOGY

## 2025-05-29 PROCEDURE — 90471 IMMUNIZATION ADMIN: CPT | Performed by: OBSTETRICS & GYNECOLOGY

## 2025-05-29 PROCEDURE — 0501F PRENATAL FLOW SHEET: CPT | Performed by: OBSTETRICS & GYNECOLOGY

## 2025-06-11 ENCOUNTER — APPOINTMENT (OUTPATIENT)
Dept: OBSTETRICS AND GYNECOLOGY | Facility: CLINIC | Age: 30
End: 2025-06-11
Payer: COMMERCIAL

## 2025-06-11 VITALS — SYSTOLIC BLOOD PRESSURE: 101 MMHG | BODY MASS INDEX: 32.12 KG/M2 | WEIGHT: 199 LBS | DIASTOLIC BLOOD PRESSURE: 66 MMHG

## 2025-06-11 DIAGNOSIS — D64.9 MILD ANEMIA: ICD-10-CM

## 2025-06-11 DIAGNOSIS — R73.9 BLOOD GLUCOSE ELEVATED: ICD-10-CM

## 2025-06-11 DIAGNOSIS — E28.2 PCOS (POLYCYSTIC OVARIAN SYNDROME): ICD-10-CM

## 2025-06-11 DIAGNOSIS — K60.30 ANAL FISTULA: ICD-10-CM

## 2025-06-11 DIAGNOSIS — O09.819 PREGNANCY RESULTING FROM ASSISTED REPRODUCTIVE TECHNOLOGY, ANTEPARTUM (HHS-HCC): Primary | ICD-10-CM

## 2025-06-11 DIAGNOSIS — Z34.90 ENCOUNTER FOR PREGNANCY RELATED EXAMINATION, ANTEPARTUM: ICD-10-CM

## 2025-06-11 DIAGNOSIS — E03.8 SUBCLINICAL HYPOTHYROIDISM: ICD-10-CM

## 2025-06-11 PROCEDURE — 0501F PRENATAL FLOW SHEET: CPT | Performed by: OBSTETRICS & GYNECOLOGY

## 2025-06-11 NOTE — PROGRESS NOTES
Ob Follow-up    SUBJECTIVE    HPI: Delia Hernández is a 29 y.o.  at 34w6d here for RPNV.  She denies vaginal bleeding, leakage of fluid, decreased fetal movements, and contractions.         OBJECTIVE  Visit Vitals  /66   Wt 90.3 kg (199 lb)   LMP 10/06/2024   BMI 32.12 kg/m²   OB Status Pregnant   Smoking Status Never   BSA 2.05 m²      See OB flowsheet      ASSESSMENT & PLAN    Delia Hernández is a 29 y.o.  at 34w6d here for the following concerns we addressed today:    Problem List Items Addressed This Visit          Ob-Gyn Problems    Pregnancy resulting from assisted reproductive technology, antepartum ([facility]) - Primary    Overview   Growth US   30w EFW 41%  36w  Weekly NSTs at 36w         Encounter for pregnancy related examination, antepartum    Overview     [x] Accepts Blood Products: Yes  [x] Initial BMI: 28  [x] Prenatal Labs: Hgb id possible alpha thalassemia  [x] Last pap: 2021 Negative, requests to defer to until after delivery  [x] Genetic Screening:  Had PGT-A, declines cfDNA screen, NT subjectively normal  [x] Fetal Sex: It's a girl!  [x] Baby ASA: Yes  [x] Pregnancy dated by: IVF    [x] Anatomy US: Normal  [x] 1hr GCT at 24-28wks: Abnormal, schedule 3hr  -See notes, 3 weeks of monitoring wnl  [x] Rhogam: O Positive  [x] Tdap Vaccine: Done  [] RSV (32-36 wks Sep-):   [x] Flu Vaccine: Declines    [] GBS:  [x] Labor preferences: Undecided  [x] 39 weeks discussion of IOL vs. Expectant management: Discussed ARRIVE trial   Discussed normal course of induction   [x] Mode of delivery: Vaginal   [x] Breastfeeding: Yes  [] Postpartum Birth control method:                  Blood glucose elevated    Overview   Abnormal 1hr GCT  Labor error during 3 hr GTT and only 75gm glucose load given  Patient requested monitoring Blood glucose for 3 weeks:  FBG All < 95  1hr postprandial vast majority <140              Other    Subclinical hypothyroidism    Overview   Check TSH  qtrimester  Synthroid 100mcg daily  1st Tri .44  2nd Tri .96   3rd Tri .99          PCOS (polycystic ovarian syndrome)    Mild anemia    Overview   Mild AYAN vs. Alpha thalassemia         Anal fistula    Overview   1/2023 s/p fisulotomy  Op report reviewed, minimal muscle involvement            US scheduled at 36w  GBS next visit  RTC in 2 weeks      Husam Jimenez MD

## 2025-06-23 ENCOUNTER — HOSPITAL ENCOUNTER (OUTPATIENT)
Dept: RADIOLOGY | Facility: CLINIC | Age: 30
Discharge: HOME | End: 2025-06-23
Payer: COMMERCIAL

## 2025-06-23 DIAGNOSIS — E03.9 HYPOTHYROIDISM IN PREGNANCY, ANTEPARTUM (HHS-HCC): ICD-10-CM

## 2025-06-23 DIAGNOSIS — O99.280 HYPOTHYROIDISM IN PREGNANCY, ANTEPARTUM (HHS-HCC): ICD-10-CM

## 2025-06-23 DIAGNOSIS — Z34.90 ENCOUNTER FOR PREGNANCY RELATED EXAMINATION, ANTEPARTUM: ICD-10-CM

## 2025-06-23 DIAGNOSIS — O09.813 PREGNANCY RESULTING FROM IN VITRO FERTILIZATION IN THIRD TRIMESTER (HHS-HCC): ICD-10-CM

## 2025-06-23 PROCEDURE — 76816 OB US FOLLOW-UP PER FETUS: CPT

## 2025-06-23 PROCEDURE — 76816 OB US FOLLOW-UP PER FETUS: CPT | Performed by: MEDICAL GENETICS

## 2025-06-23 PROCEDURE — 76819 FETAL BIOPHYS PROFIL W/O NST: CPT

## 2025-06-23 PROCEDURE — 76819 FETAL BIOPHYS PROFIL W/O NST: CPT | Performed by: MEDICAL GENETICS

## 2025-06-23 NOTE — PROGRESS NOTES
Ob Follow-up    SUBJECTIVE    HPI: Delia Hernández is a 29 y.o.  at 36w6d here for RPNV.  She denies vaginal bleeding, leakage of fluid, decreased fetal movements, and contractions.         OBJECTIVE  Visit Vitals  /64   Wt 90.7 kg (200 lb)   LMP 10/06/2024   BMI 32.28 kg/m²   OB Status Pregnant   Smoking Status Never   BSA 2.05 m²      See OB flowsheet      ASSESSMENT & PLAN    Delia Hernández is a 29 y.o.  at 36w6d here for the following concerns we addressed today:    Problem List Items Addressed This Visit          Ob-Gyn Problems    Pregnancy resulting from assisted reproductive technology, antepartum ([facility]) - Primary    Overview   Growth US   30w EFW 41%  36w EFW 36% appropriate interval growth  Weekly NSTs at 36w         Encounter for pregnancy related examination, antepartum    Overview     [x] Accepts Blood Products: Yes  [x] Initial BMI: 28  [x] Prenatal Labs: Hgb id possible alpha thalassemia  [x] Last pap: 2021 Negative, requests to defer to until after delivery  [x] Genetic Screening:  Had PGT-A, declines cfDNA screen, NT subjectively normal  [x] Fetal Sex: It's a girl!  [x] Baby ASA: Yes  [x] Pregnancy dated by: IVF    [x] Anatomy US: Normal  [x] 1hr GCT at 24-28wks: Abnormal, schedule 3hr  -See notes, 3 weeks of monitoring wnl  [x] Rhogam: O Positive  [x] Tdap Vaccine: Done  [] RSV (32-36 wks Sep-):   [x] Flu Vaccine: Declines    [] GBS:  [x] Labor preferences: Undecided  [x] 39 weeks discussion of IOL vs. Expectant management: Discussed ARRIVE trial   Discussed normal course of induction   [x] Mode of delivery: Vaginal   [x] Breastfeeding: Yes  [] Postpartum Birth control method:                  Relevant Orders    Group B Streptococcus (GBS) Prenatal Screen, Culture    Blood glucose elevated    Overview   Abnormal 1hr GCT  Labor error during 3 hr GTT and only 75gm glucose load given  Patient requested monitoring Blood glucose for 3 weeks:  FBG All < 95  1hr  postprandial vast majority <140              Other    Subclinical hypothyroidism    Overview   Check TSH qtrimester  Synthroid 100mcg daily  1st Tri .44  2nd Tri .96   3rd Tri .99          Mild anemia    Overview   Mild AYAN vs. Alpha thalassemia         Anal fistula    Overview   1/2023 s/p fistulotomy  Op report reviewed, minimal muscle involvement          GBS today  Labor precautions  Reviewed induction at 39w, still considering  RTC in 1 week      Husam Jimenez MD

## 2025-06-25 ENCOUNTER — PATIENT MESSAGE (OUTPATIENT)
Dept: MATERNAL FETAL MEDICINE | Facility: CLINIC | Age: 30
End: 2025-06-25

## 2025-06-25 ENCOUNTER — APPOINTMENT (OUTPATIENT)
Dept: OBSTETRICS AND GYNECOLOGY | Facility: CLINIC | Age: 30
End: 2025-06-25
Payer: COMMERCIAL

## 2025-06-25 VITALS — DIASTOLIC BLOOD PRESSURE: 64 MMHG | WEIGHT: 200 LBS | SYSTOLIC BLOOD PRESSURE: 100 MMHG | BODY MASS INDEX: 32.28 KG/M2

## 2025-06-25 DIAGNOSIS — D64.9 MILD ANEMIA: ICD-10-CM

## 2025-06-25 DIAGNOSIS — Z34.90 ENCOUNTER FOR PREGNANCY RELATED EXAMINATION, ANTEPARTUM: ICD-10-CM

## 2025-06-25 DIAGNOSIS — E03.8 SUBCLINICAL HYPOTHYROIDISM: ICD-10-CM

## 2025-06-25 DIAGNOSIS — K60.30 ANAL FISTULA: ICD-10-CM

## 2025-06-25 DIAGNOSIS — O09.819 PREGNANCY RESULTING FROM ASSISTED REPRODUCTIVE TECHNOLOGY, ANTEPARTUM (HHS-HCC): Primary | ICD-10-CM

## 2025-06-25 DIAGNOSIS — R73.9 BLOOD GLUCOSE ELEVATED: ICD-10-CM

## 2025-06-25 PROCEDURE — 0501F PRENATAL FLOW SHEET: CPT | Performed by: OBSTETRICS & GYNECOLOGY

## 2025-06-25 ASSESSMENT — PATIENT HEALTH QUESTIONNAIRE - PHQ9
2. FEELING DOWN, DEPRESSED OR HOPELESS: NOT AT ALL
SUM OF ALL RESPONSES TO PHQ9 QUESTIONS 1 AND 2: 0
1. LITTLE INTEREST OR PLEASURE IN DOING THINGS: NOT AT ALL

## 2025-06-25 ASSESSMENT — ENCOUNTER SYMPTOMS
DEPRESSION: 0
OCCASIONAL FEELINGS OF UNSTEADINESS: 0
LOSS OF SENSATION IN FEET: 0

## 2025-06-28 LAB — GP B STREP SPEC QL CULT: NORMAL

## 2025-06-30 NOTE — PROGRESS NOTES
Assessment/Plan   Problem List Items Addressed This Visit       Subclinical hypothyroidism - Primary    Overview   Check TSH qtrimester  Synthroid 100mcg daily  1st Tri .44  2nd Tri .96   3rd Tri .99          PCOS (polycystic ovarian syndrome)    Pregnancy resulting from assisted reproductive technology, antepartum ([facility])    Overview   Growth US   30w EFW 41%  36w EFW 36% appropriate interval growth  Weekly NSTs at 36w         Encounter for pregnancy related examination, antepartum    Overview     [x] Accepts Blood Products: Yes  [x] Initial BMI: 28  [x] Prenatal Labs: Hgb id possible alpha thalassemia  [x] Last pap: 2021 Negative, requests to defer to until after delivery  [x] Genetic Screening:  Had PGT-A, declines cfDNA screen, NT subjectively normal  [x] Fetal Sex: It's a girl!  [x] Baby ASA: Yes  [x] Pregnancy dated by: IVF    [x] Anatomy US: Normal  [x] 1hr GCT at 24-28wks: Abnormal, schedule 3hr  -See notes, 3 weeks of monitoring wnl  [x] Rhogam: O Positive  [x] Tdap Vaccine: Done  [] RSV (32-36 wks Sep-):   [x] Flu Vaccine: Declines    [] GBS:  [x] Labor preferences: Undecided  [x] 39 weeks discussion of IOL vs. Expectant management: Discussed ARRIVE trial   Discussed normal course of induction   [x] Mode of delivery: Vaginal   [x] Breastfeeding: Yes  [] Postpartum Birth control method:                  Blood glucose elevated    Overview   Abnormal 1hr GCT  Labor error during 3 hr GTT and only 75gm glucose load given  Patient requested monitoring Blood glucose for 3 weeks:  FBG All < 95  1hr postprandial vast majority <140                 Reviewed s/sx of labor, warning signs, fetal movement counts, and when to call provider  Follow up in 1 week for a routine prenatal visit.    Francy Vogt, MATA-HILLARY    Subjective     Delia Hernández is a 29 y.o.  at 37w4d with a working estimated date of delivery of 2025, by Embryo Transfer who presents for a routine prenatal visit.     Vaginal  bleeding no  Leakage of fluid no  Decreased fetal movements no  Contractions no    Postpartum Depression: Low Risk  (2025)    [place]  Depression Scale     Last EPDS Total Score: 0     Last EPDS Self Harm Result: Never        Prenatal Labs  Lab Results   Component Value Date    HGB 11.3 (L) 2025    HCT 34.3 (L) 2025     2025    ABO O 12/10/2024    LABRH POS 12/10/2024    NEISSGONOAMP Negative 12/10/2024    CHLAMTRACAMP Negative 12/10/2024    SYPHT Negative 2025    HEPBSAG Nonreactive 12/10/2024    HIV1X2 Nonreactive 12/10/2024    URINECULTURE SEE NOTE 2025     Lab Results   Component Value Date    GLUC1P 145 (H) 2025     STREPTOCOCCUS, GROUP B CULTURE   Date Value Ref Range Status   2025 SEE NOTE  Final     Comment:         STREPTOCOCCUS, GROUP B CULTURE         Micro Number:      [id number]    Test Status:       Final    Specimen Source:   Vaginal/anorectal    Specimen Quality:  Adequate    Result:            No group B Streptococcus isolated                       Note per CDC guidelines optimal recovery is                       achieved by swabbing both the lower vagina and                       rectum (through the anal sphincter).          Education provided    The [facility] has a Certified Nurse Midwife on call  who is available to discuss any pregnancy related concerns or urgent needs that cannot wait until the next business day.  Please call the office where you are seen at during the day.  On call numbers for after hours are listed below.    At any time you would like to tour our facilities, please call [phone] to set up a tour    If you are  (less than 37) weeks and experience:     More than 5 contractions in an 1 hour period   If you have fluid leaking from your vagina   Bleeding that is as heavy as a period   Decreased fetal movements or changes in your baby's movement after 24 weeks   A headache  that does not go away with Tylenol or rest    If you full term (37 weeks or greater) and experience:     Contractions that are 5 minutes a part for 2 hours that you cannot walk or talk through   A gush of fluid from your vagina   Bleeding that is as heavy as a period   Decrease fetal movements or changes in your baby's movements   A headache that does not go away with Tylenol or rest     During the weekday office hours please call the office you are normally seen at.    After hours please call:  For patients planning on birthing at [facility] or [facility]) and need to speak to the midwife on call:  [phone]    For patients planning on birthing at [facility] and need to speak to the the midwife on call:  [phone]      DO NOT USE [employer] MESSAGES - THEY ARE NOT MONITORED      Methods of Labor Induction  An induction of labor can include a combination of any of the following methods:  _______________________________________________________________________________________________________________________________  Cervical Ripening   Ripening is the process of softening, thinning (effacing), and opening (dilating) the cervix. Your provider will use either medicine or mechanical techniques, or a combination of both, during this phase.     Medicine:  The most commonly used medication is Cytotec® (misoprostol). Your provider will insert it as a tablet into your vagina. This can be done every 3 hours until the cervix is about 3-4 cm dilated.    Prostaglandin (hormone) that softens and thins the cervix, may cause contractions  Has been used for approximately 25 years for labor induction  Cannot be used if you have had a prior  or surgery on your uterus  Can sometimes cause excessive uterine contractions (a medication called Terbutaline can be used to slow down the contractions)        Mechanical - Cervical Ripening Balloon:   A cervical ripening balloon is a device that  causes the cervix to release natural prostaglandin hormones   A thin, flexible catheter is placed through the cervix using either a speculum or a digital exam  A small balloon at the end of the device is filled with water - this puts gentle, constant pressure on the cervix causing the release nature prostaglandins that help ripen the cervix.  As your cervix is opening, eventually the balloon will fall out; or the balloon can be deflated and removed if still in after 12 hours        _______________________________________________________________________________________________________________________________  Pitocin®  Pitocin® is a medication version of oxytocin - a hormone in your body that causes contractions to begin    Given through an IV (into the vein) line in your arm.   Started at a low dose - rate will be gradually increased every 30 minutes until contractions are occurring about every 2-3 minutes  Contractions help your cervix thin and dilate  Most patients become aware of their contractions about 30 to 60 minutes after starting Pitocin® -  you may feel the contractions as mild, period-like cramping that usually becomes stronger and more frequent as labor progresses     _______________________________________________________________________________________________________________________________  Rupture of Membranes (water breaking)  Rupture of membranes means that the bag containing the amniotic fluid around the baby has broken.     To break your bag of water, your cervix needs to be at least 2-3 cm dilated and baby's head needs to be well applied to your cervix.    Your provider will insert a tiny plastic hook into your vagina during a cervical exam to break your water - you may feel the amniotic fluid running out as the membranes break.   Your contractions are likely to become stronger and closer together, helping your labor  progress      _______________________________________________________________________________________________________________________________    What will my induction look like?  If you desire an induction of labor for risk reduction, an appointment will be scheduled on the labor and delivery unit (L&D) for a date and time as early as 39 weeks (1 week prior to your due date).    Date and time of induction is subject to change or may be postponed depending on how busy L&D is or availability of nurse staffing.   Please be sure to eat a healthy meal before you come in for your labor induction unless your care team tells you not to.        First labor and/or cervix = 1 cm or less       Prior vaginal delivery and/or first labor cervix = 2-3 cm          You and your baby will be closely monitored throughout all phases of labor. You are encouraged to change positions and move around out of the bed. Cervical exam frequency will depend on your individual labor progress. Progress means your cervix continues to dilate and efface and/or the baby is moving lower into your pelvis. The duration of labor depends on how long it takes your cervix to thin and open, and when contractions begin.     If you and your baby are doing well,          the goal is to be patient and make every effort to have a vaginal birth.    If your cervix is dilated less than 6 centimeters, the goal is to allow at least 12 to 18 hours after rupture of membranes and being on Pitocin® to progress into active labor before we consider discussing a . If labor does not progress at this stage, it is called “failed induction of labor.”      If your cervix is dilated 6 centimeters or more, the goal is to have strong, effective contractions and for labor to progress at least every 4 to 6 hours before we consider discussing a . If there are health and safety concerns about you or your baby, your care team may recommend a . If labor progress  has stalled at this stage, it is called “arrest of active labor”.     If you have any questions at any time about your labor, please ask. A safe delivery is a team effort and you are a valuable member of the team. Our goal is to include you in decisions about your care.

## 2025-07-01 ENCOUNTER — APPOINTMENT (OUTPATIENT)
Dept: OBSTETRICS AND GYNECOLOGY | Facility: CLINIC | Age: 30
End: 2025-07-01
Payer: COMMERCIAL

## 2025-07-01 VITALS — DIASTOLIC BLOOD PRESSURE: 73 MMHG | SYSTOLIC BLOOD PRESSURE: 104 MMHG | BODY MASS INDEX: 31.96 KG/M2 | WEIGHT: 198 LBS

## 2025-07-01 DIAGNOSIS — E28.2 PCOS (POLYCYSTIC OVARIAN SYNDROME): ICD-10-CM

## 2025-07-01 DIAGNOSIS — O09.819 PREGNANCY RESULTING FROM ASSISTED REPRODUCTIVE TECHNOLOGY, ANTEPARTUM (HHS-HCC): ICD-10-CM

## 2025-07-01 DIAGNOSIS — R73.9 BLOOD GLUCOSE ELEVATED: ICD-10-CM

## 2025-07-01 DIAGNOSIS — Z34.80 SUPERVISION OF OTHER NORMAL PREGNANCY, ANTEPARTUM (HHS-HCC): ICD-10-CM

## 2025-07-01 DIAGNOSIS — E03.8 SUBCLINICAL HYPOTHYROIDISM: Primary | ICD-10-CM

## 2025-07-01 DIAGNOSIS — Z34.90 ENCOUNTER FOR PREGNANCY RELATED EXAMINATION, ANTEPARTUM: ICD-10-CM

## 2025-07-01 PROCEDURE — 0501F PRENATAL FLOW SHEET: CPT | Performed by: ADVANCED PRACTICE MIDWIFE

## 2025-07-01 PROCEDURE — 59025 FETAL NON-STRESS TEST: CPT | Performed by: ADVANCED PRACTICE MIDWIFE

## 2025-07-07 ENCOUNTER — TELEPHONE (OUTPATIENT)
Dept: OBSTETRICS AND GYNECOLOGY | Facility: CLINIC | Age: 30
End: 2025-07-07
Payer: COMMERCIAL

## 2025-07-07 NOTE — TELEPHONE ENCOUNTER
Patient called in stated that she  wanted a refill for larger quantity of RX.Patient stated she went to  Rx and it was a smaller quantity and that she would have to pay more.Patient stated she would like a 90 day supply and that would cost her less.Patient is currently 38 weeks and 4 day.Patient stated another Physician prescribed her RX.Please advise.    Last office visit:07/01/25  Next office visit:7/10/25  Med:levothyroxine (Synthroid, Levoxyl) 50 mcg tablet   Pharmacy:[facility]/pharmacy #[id number] - [place], [place] - [address]   [address] [facility] [address]       Hailey Garcia CNA

## 2025-07-08 DIAGNOSIS — E03.8 SUBCLINICAL HYPOTHYROIDISM: ICD-10-CM

## 2025-07-08 RX ORDER — LEVOTHYROXINE SODIUM 50 UG/1
100 TABLET ORAL DAILY
Qty: 180 TABLET | Refills: 3 | Status: ON HOLD | OUTPATIENT
Start: 2025-07-08 | End: 2026-07-08

## 2025-07-10 ENCOUNTER — APPOINTMENT (OUTPATIENT)
Dept: OBSTETRICS AND GYNECOLOGY | Facility: CLINIC | Age: 30
End: 2025-07-10
Payer: COMMERCIAL

## 2025-07-10 VITALS — SYSTOLIC BLOOD PRESSURE: 111 MMHG | BODY MASS INDEX: 32.6 KG/M2 | WEIGHT: 202 LBS | DIASTOLIC BLOOD PRESSURE: 75 MMHG

## 2025-07-10 DIAGNOSIS — K60.30 ANAL FISTULA: ICD-10-CM

## 2025-07-10 DIAGNOSIS — D64.9 MILD ANEMIA: ICD-10-CM

## 2025-07-10 DIAGNOSIS — R73.9 BLOOD GLUCOSE ELEVATED: ICD-10-CM

## 2025-07-10 DIAGNOSIS — O09.819 PREGNANCY RESULTING FROM ASSISTED REPRODUCTIVE TECHNOLOGY, ANTEPARTUM (HHS-HCC): Primary | ICD-10-CM

## 2025-07-10 DIAGNOSIS — Z34.90 ENCOUNTER FOR PREGNANCY RELATED EXAMINATION, ANTEPARTUM: ICD-10-CM

## 2025-07-10 DIAGNOSIS — E03.8 SUBCLINICAL HYPOTHYROIDISM: ICD-10-CM

## 2025-07-10 PROCEDURE — 0501F PRENATAL FLOW SHEET: CPT | Performed by: OBSTETRICS & GYNECOLOGY

## 2025-07-10 NOTE — PROGRESS NOTES
Ob Follow-up    SUBJECTIVE    HPI: Delia Hernández is a 29 y.o.  at 39w here for RPNV.  She denies vaginal bleeding, leakage of fluid, decreased fetal movements, and contractions.       OBJECTIVE  Visit Vitals  /75   Wt 91.6 kg (202 lb)   LMP 10/06/2024   BMI 32.60 kg/m²   OB Status Pregnant   Smoking Status Never   BSA 2.07 m²      See OB flowsheet      ASSESSMENT & PLAN    Delia Hernández is a 29 y.o.  at 39w here for the following concerns we addressed today:    Problem List Items Addressed This Visit          Ob-Gyn Problems    Pregnancy resulting from assisted reproductive technology, antepartum ([facility]) - Primary    Overview   Growth US   30w EFW 41%  36w EFW 36% appropriate interval growth  Weekly NSTs at 36w         Encounter for pregnancy related examination, antepartum    Overview     [x] Accepts Blood Products: Yes  [x] Initial BMI: 28  [x] Prenatal Labs: Hgb id possible alpha thalassemia  [x] Last pap: 2021 Negative, requests to defer to until after delivery  [x] Genetic Screening:  Had PGT-A, declines cfDNA screen, NT subjectively normal  [x] Fetal Sex: It's a girl!  [x] Baby ASA: Yes  [x] Pregnancy dated by: IVF    [x] Anatomy US: Normal  [x] 1hr GCT at 24-28wks: Abnormal, schedule 3hr  -See notes, 3 weeks of monitoring wnl  [x] Rhogam: O Positive  [x] Tdap Vaccine: Done  [] RSV (32-36 wks Sep-):   [x] Flu Vaccine: Declines    [x] GBS: Negative  [x] Labor preferences: Undecided  [x] 39 weeks discussion of IOL vs. Expectant management: Discussed ARRIVE trial.  Induction scheduled on  during visit with Elizabeth Vogt. Discussed normal course of induction   [x] Mode of delivery: Vaginal   [x] Breastfeeding: Yes  [] Postpartum Birth control method:                  Blood glucose elevated    Overview   Abnormal 1hr GCT  Labor error during 3 hr GTT and only 75gm glucose load given  Patient requested monitoring Blood glucose for 3 weeks:  FBG All < 95  1hr postprandial vast  majority <140              Other    Subclinical hypothyroidism    Overview   Check TSH qtrimester  Synthroid 100mcg daily  1st Tri .44  2nd Tri .96   3rd Tri .99          Mild anemia    Overview   Mild AYAN vs. Alpha thalassemia         Anal fistula    Overview   1/2023 s/p fistulotomy  Op report reviewed, minimal muscle involvement            Induction scheduled tomorrow at 8p, reviewed normal course of induction/cervical ripening again as well as coverage system.  All questions answered.   RTC for postpartum visit      Husam Jimenez MD

## 2025-07-11 ENCOUNTER — ANESTHESIA (OUTPATIENT)
Dept: OBSTETRICS AND GYNECOLOGY | Facility: HOSPITAL | Age: 30
End: 2025-07-11
Payer: COMMERCIAL

## 2025-07-11 ENCOUNTER — APPOINTMENT (OUTPATIENT)
Dept: OBSTETRICS AND GYNECOLOGY | Facility: HOSPITAL | Age: 30
End: 2025-07-11
Payer: COMMERCIAL

## 2025-07-11 ENCOUNTER — ANESTHESIA EVENT (OUTPATIENT)
Dept: OBSTETRICS AND GYNECOLOGY | Facility: HOSPITAL | Age: 30
End: 2025-07-11
Payer: COMMERCIAL

## 2025-07-11 ENCOUNTER — HOSPITAL ENCOUNTER (INPATIENT)
Facility: HOSPITAL | Age: 30
End: 2025-07-11
Attending: OBSTETRICS & GYNECOLOGY | Admitting: OBSTETRICS & GYNECOLOGY
Payer: COMMERCIAL

## 2025-07-11 DIAGNOSIS — O09.819 PREGNANCY RESULTING FROM ASSISTED REPRODUCTIVE TECHNOLOGY, ANTEPARTUM (HHS-HCC): ICD-10-CM

## 2025-07-11 DIAGNOSIS — Z34.80 SUPERVISION OF OTHER NORMAL PREGNANCY, ANTEPARTUM (HHS-HCC): ICD-10-CM

## 2025-07-11 DIAGNOSIS — O24.410 DIET CONTROLLED GESTATIONAL DIABETES MELLITUS (GDM), ANTEPARTUM (HHS-HCC): Primary | ICD-10-CM

## 2025-07-11 PROBLEM — Z34.83 MULTIGRAVIDA IN THIRD TRIMESTER (HHS-HCC): Status: ACTIVE | Noted: 2025-07-11

## 2025-07-11 LAB
ABO GROUP (TYPE) IN BLOOD: NORMAL
ABO GROUP (TYPE) IN BLOOD: NORMAL
ANTIBODY SCREEN: NORMAL
ERYTHROCYTE [DISTWIDTH] IN BLOOD BY AUTOMATED COUNT: 13.7 % (ref 11.5–14.5)
HCT VFR BLD AUTO: 36 % (ref 36–46)
HGB BLD-MCNC: 11.8 G/DL (ref 12–16)
MCH RBC QN AUTO: 26.9 PG (ref 26–34)
MCHC RBC AUTO-ENTMCNC: 32.8 G/DL (ref 32–36)
MCV RBC AUTO: 82 FL (ref 80–100)
NRBC BLD-RTO: 0 /100 WBCS (ref 0–0)
PLATELET # BLD AUTO: 241 X10*3/UL (ref 150–450)
RBC # BLD AUTO: 4.39 X10*6/UL (ref 4–5.2)
RH FACTOR (ANTIGEN D): NORMAL
RH FACTOR (ANTIGEN D): NORMAL
WBC # BLD AUTO: 8.3 X10*3/UL (ref 4.4–11.3)

## 2025-07-11 PROCEDURE — 85027 COMPLETE CBC AUTOMATED: CPT | Performed by: NURSE PRACTITIONER

## 2025-07-11 PROCEDURE — 86780 TREPONEMA PALLIDUM: CPT | Mod: AHULAB | Performed by: NURSE PRACTITIONER

## 2025-07-11 PROCEDURE — 59025 FETAL NON-STRESS TEST: CPT | Performed by: OBSTETRICS & GYNECOLOGY

## 2025-07-11 PROCEDURE — 2500000001 HC RX 250 WO HCPCS SELF ADMINISTERED DRUGS (ALT 637 FOR MEDICARE OP): Performed by: NURSE PRACTITIONER

## 2025-07-11 PROCEDURE — 86850 RBC ANTIBODY SCREEN: CPT | Performed by: NURSE PRACTITIONER

## 2025-07-11 PROCEDURE — 36415 COLL VENOUS BLD VENIPUNCTURE: CPT | Performed by: OBSTETRICS & GYNECOLOGY

## 2025-07-11 PROCEDURE — 59050 FETAL MONITOR W/REPORT: CPT

## 2025-07-11 PROCEDURE — 2720000007 HC OR 272 NO HCPCS

## 2025-07-11 PROCEDURE — 51701 INSERT BLADDER CATHETER: CPT

## 2025-07-11 PROCEDURE — 36415 COLL VENOUS BLD VENIPUNCTURE: CPT | Performed by: NURSE PRACTITIONER

## 2025-07-11 PROCEDURE — 2500000004 HC RX 250 GENERAL PHARMACY W/ HCPCS (ALT 636 FOR OP/ED): Performed by: NURSE PRACTITIONER

## 2025-07-11 PROCEDURE — 1220000001 HC OB SEMI-PRIVATE ROOM DAILY

## 2025-07-11 PROCEDURE — 7210000002 HC LABOR PER HOUR

## 2025-07-11 RX ORDER — METHYLERGONOVINE MALEATE 0.2 MG/ML
0.2 INJECTION INTRAVENOUS ONCE AS NEEDED
Status: DISCONTINUED | OUTPATIENT
Start: 2025-07-11 | End: 2025-07-12 | Stop reason: SDUPTHER

## 2025-07-11 RX ORDER — OXYTOCIN 10 [USP'U]/ML
10 INJECTION, SOLUTION INTRAMUSCULAR; INTRAVENOUS ONCE AS NEEDED
Status: DISCONTINUED | OUTPATIENT
Start: 2025-07-11 | End: 2025-07-12 | Stop reason: SDUPTHER

## 2025-07-11 RX ORDER — SODIUM CHLORIDE, SODIUM LACTATE, POTASSIUM CHLORIDE, CALCIUM CHLORIDE 600; 310; 30; 20 MG/100ML; MG/100ML; MG/100ML; MG/100ML
75 INJECTION, SOLUTION INTRAVENOUS CONTINUOUS
Status: DISCONTINUED | OUTPATIENT
Start: 2025-07-11 | End: 2025-07-12

## 2025-07-11 RX ORDER — TERBUTALINE SULFATE 1 MG/ML
0.25 INJECTION SUBCUTANEOUS ONCE AS NEEDED
Status: DISCONTINUED | OUTPATIENT
Start: 2025-07-11 | End: 2025-07-12

## 2025-07-11 RX ORDER — TRANEXAMIC ACID 1 G/10ML
1000 INJECTION, SOLUTION INTRAVENOUS ONCE AS NEEDED
Status: DISCONTINUED | OUTPATIENT
Start: 2025-07-11 | End: 2025-07-12 | Stop reason: SDUPTHER

## 2025-07-11 RX ORDER — CARBOPROST TROMETHAMINE 250 UG/ML
250 INJECTION, SOLUTION INTRAMUSCULAR ONCE AS NEEDED
Status: DISCONTINUED | OUTPATIENT
Start: 2025-07-11 | End: 2025-07-12 | Stop reason: SDUPTHER

## 2025-07-11 RX ORDER — HYDRALAZINE HYDROCHLORIDE 20 MG/ML
5 INJECTION INTRAMUSCULAR; INTRAVENOUS ONCE AS NEEDED
Status: DISCONTINUED | OUTPATIENT
Start: 2025-07-11 | End: 2025-07-12 | Stop reason: SDUPTHER

## 2025-07-11 RX ORDER — MISOPROSTOL 200 UG/1
800 TABLET ORAL ONCE AS NEEDED
Status: DISCONTINUED | OUTPATIENT
Start: 2025-07-11 | End: 2025-07-12 | Stop reason: SDUPTHER

## 2025-07-11 RX ORDER — CHOLECALCIFEROL (VITAMIN D3) 25 MCG
50 TABLET ORAL DAILY
Status: DISCONTINUED | OUTPATIENT
Start: 2025-07-12 | End: 2025-07-12

## 2025-07-11 RX ORDER — CALCIUM CARBONATE 200(500)MG
1 TABLET,CHEWABLE ORAL EVERY 6 HOURS PRN
Status: DISCONTINUED | OUTPATIENT
Start: 2025-07-11 | End: 2025-07-12

## 2025-07-11 RX ORDER — OXYTOCIN/0.9 % SODIUM CHLORIDE 30/500 ML
60 PLASTIC BAG, INJECTION (ML) INTRAVENOUS ONCE AS NEEDED
Status: DISCONTINUED | OUTPATIENT
Start: 2025-07-11 | End: 2025-07-12 | Stop reason: SDUPTHER

## 2025-07-11 RX ORDER — LIDOCAINE HYDROCHLORIDE 10 MG/ML
20 INJECTION, SOLUTION INFILTRATION; PERINEURAL ONCE AS NEEDED
Status: DISCONTINUED | OUTPATIENT
Start: 2025-07-11 | End: 2025-07-12

## 2025-07-11 RX ORDER — ONDANSETRON HYDROCHLORIDE 2 MG/ML
4 INJECTION, SOLUTION INTRAVENOUS EVERY 6 HOURS PRN
Status: DISCONTINUED | OUTPATIENT
Start: 2025-07-11 | End: 2025-07-12 | Stop reason: SDUPTHER

## 2025-07-11 RX ORDER — OXYTOCIN/0.9 % SODIUM CHLORIDE 30/500 ML
60 PLASTIC BAG, INJECTION (ML) INTRAVENOUS ONCE AS NEEDED
Status: COMPLETED | OUTPATIENT
Start: 2025-07-11 | End: 2025-07-12

## 2025-07-11 RX ORDER — LABETALOL HYDROCHLORIDE 5 MG/ML
20 INJECTION, SOLUTION INTRAVENOUS ONCE AS NEEDED
Status: DISCONTINUED | OUTPATIENT
Start: 2025-07-11 | End: 2025-07-12 | Stop reason: SDUPTHER

## 2025-07-11 RX ORDER — ONDANSETRON 4 MG/1
4 TABLET, FILM COATED ORAL EVERY 6 HOURS PRN
Status: DISCONTINUED | OUTPATIENT
Start: 2025-07-11 | End: 2025-07-12 | Stop reason: SDUPTHER

## 2025-07-11 RX ORDER — LOPERAMIDE HYDROCHLORIDE 2 MG/1
4 CAPSULE ORAL EVERY 2 HOUR PRN
Status: DISCONTINUED | OUTPATIENT
Start: 2025-07-11 | End: 2025-07-12 | Stop reason: SDUPTHER

## 2025-07-11 RX ADMIN — MISOPROSTOL 25 MCG: 100 TABLET ORAL at 21:09

## 2025-07-11 RX ADMIN — SODIUM CHLORIDE, SODIUM LACTATE, POTASSIUM CHLORIDE, AND CALCIUM CHLORIDE 500 ML: .6; .31; .03; .02 INJECTION, SOLUTION INTRAVENOUS at 22:41

## 2025-07-11 SDOH — SOCIAL STABILITY: SOCIAL INSECURITY: ARE YOU OR HAVE YOU BEEN THREATENED OR ABUSED PHYSICALLY, EMOTIONALLY, OR SEXUALLY BY ANYONE?: NO

## 2025-07-11 SDOH — SOCIAL STABILITY: SOCIAL INSECURITY
WITHIN THE LAST YEAR, HAVE YOU BEEN RAPED OR FORCED TO HAVE ANY KIND OF SEXUAL ACTIVITY BY YOUR PARTNER OR EX-PARTNER?: NO

## 2025-07-11 SDOH — ECONOMIC STABILITY: FOOD INSECURITY: WITHIN THE PAST 12 MONTHS, THE FOOD YOU BOUGHT JUST DIDN'T LAST AND YOU DIDN'T HAVE MONEY TO GET MORE.: NEVER TRUE

## 2025-07-11 SDOH — HEALTH STABILITY: MENTAL HEALTH: HOW OFTEN DO YOU HAVE SIX OR MORE DRINKS ON ONE OCCASION?: NEVER

## 2025-07-11 SDOH — ECONOMIC STABILITY: FOOD INSECURITY: HOW HARD IS IT FOR YOU TO PAY FOR THE VERY BASICS LIKE FOOD, HOUSING, MEDICAL CARE, AND HEATING?: NOT VERY HARD

## 2025-07-11 SDOH — SOCIAL STABILITY: SOCIAL INSECURITY: WITHIN THE LAST YEAR, HAVE YOU BEEN HUMILIATED OR EMOTIONALLY ABUSED IN OTHER WAYS BY YOUR PARTNER OR EX-PARTNER?: NO

## 2025-07-11 SDOH — ECONOMIC STABILITY: FOOD INSECURITY: WITHIN THE PAST 12 MONTHS, YOU WORRIED THAT YOUR FOOD WOULD RUN OUT BEFORE YOU GOT THE MONEY TO BUY MORE.: NEVER TRUE

## 2025-07-11 SDOH — HEALTH STABILITY: MENTAL HEALTH: HOW MANY DRINKS CONTAINING ALCOHOL DO YOU HAVE ON A TYPICAL DAY WHEN YOU ARE DRINKING?: PATIENT DOES NOT DRINK

## 2025-07-11 SDOH — HEALTH STABILITY: MENTAL HEALTH: WISH TO BE DEAD (PAST 1 MONTH): NO

## 2025-07-11 SDOH — SOCIAL STABILITY: SOCIAL INSECURITY
WITHIN THE LAST YEAR, HAVE YOU BEEN KICKED, HIT, SLAPPED, OR OTHERWISE PHYSICALLY HURT BY YOUR PARTNER OR EX-PARTNER?: NO

## 2025-07-11 SDOH — SOCIAL STABILITY: SOCIAL INSECURITY: HAS ANYONE EVER THREATENED TO HURT YOUR FAMILY OR YOUR PETS?: NO

## 2025-07-11 SDOH — SOCIAL STABILITY: SOCIAL INSECURITY: WITHIN THE LAST YEAR, HAVE YOU BEEN AFRAID OF YOUR PARTNER OR EX-PARTNER?: NO

## 2025-07-11 SDOH — HEALTH STABILITY: MENTAL HEALTH: NON-SPECIFIC ACTIVE SUICIDAL THOUGHTS (PAST 1 MONTH): NO

## 2025-07-11 SDOH — HEALTH STABILITY: MENTAL HEALTH: SUICIDAL BEHAVIOR (LIFETIME): NO

## 2025-07-11 SDOH — ECONOMIC STABILITY: TRANSPORTATION INSECURITY: IN THE PAST 12 MONTHS, HAS LACK OF TRANSPORTATION KEPT YOU FROM MEDICAL APPOINTMENTS OR FROM GETTING MEDICATIONS?: NO

## 2025-07-11 SDOH — SOCIAL STABILITY: SOCIAL INSECURITY: HAVE YOU HAD THOUGHTS OF HARMING ANYONE ELSE?: NO

## 2025-07-11 SDOH — SOCIAL STABILITY: SOCIAL INSECURITY: ABUSE SCREEN: ADULT

## 2025-07-11 SDOH — HEALTH STABILITY: MENTAL HEALTH: HAVE YOU USED ANY PRESCRIPTION DRUGS OTHER THAN PRESCRIBED IN THE PAST 12 MONTHS?: NO

## 2025-07-11 SDOH — SOCIAL STABILITY: SOCIAL INSECURITY: PHYSICAL ABUSE: DENIES

## 2025-07-11 SDOH — HEALTH STABILITY: PHYSICAL HEALTH
HOW OFTEN DO YOU NEED TO HAVE SOMEONE HELP YOU WHEN YOU READ INSTRUCTIONS, PAMPHLETS, OR OTHER WRITTEN MATERIAL FROM YOUR DOCTOR OR PHARMACY?: NEVER

## 2025-07-11 SDOH — HEALTH STABILITY: MENTAL HEALTH: HAVE YOU USED ANY SUBSTANCES (CANABIS, COCAINE, HEROIN, HALLUCINOGENS, INHALANTS, ETC.) IN THE PAST 12 MONTHS?: NO

## 2025-07-11 SDOH — SOCIAL STABILITY: SOCIAL INSECURITY: DO YOU FEEL ANYONE HAS EXPLOITED OR TAKEN ADVANTAGE OF YOU FINANCIALLY OR OF YOUR PERSONAL PROPERTY?: NO

## 2025-07-11 SDOH — HEALTH STABILITY: MENTAL HEALTH: CURRENT SMOKER: 0

## 2025-07-11 SDOH — SOCIAL STABILITY: SOCIAL INSECURITY: HAVE YOU HAD ANY THOUGHTS OF HARMING ANYONE ELSE?: NO

## 2025-07-11 SDOH — HEALTH STABILITY: MENTAL HEALTH: WERE YOU ABLE TO COMPLETE ALL THE BEHAVIORAL HEALTH SCREENINGS?: YES

## 2025-07-11 SDOH — SOCIAL STABILITY: SOCIAL INSECURITY: VERBAL ABUSE: DENIES

## 2025-07-11 SDOH — SOCIAL STABILITY: SOCIAL INSECURITY: DOES ANYONE TRY TO KEEP YOU FROM HAVING/CONTACTING OTHER FRIENDS OR DOING THINGS OUTSIDE YOUR HOME?: NO

## 2025-07-11 SDOH — ECONOMIC STABILITY: HOUSING INSECURITY: DO YOU FEEL UNSAFE GOING BACK TO THE PLACE WHERE YOU ARE LIVING?: NO

## 2025-07-11 SDOH — SOCIAL STABILITY: SOCIAL INSECURITY: ARE THERE ANY APPARENT SIGNS OF INJURIES/BEHAVIORS THAT COULD BE RELATED TO ABUSE/NEGLECT?: NO

## 2025-07-11 ASSESSMENT — PAIN SCALES - GENERAL
PAINLEVEL_OUTOF10: 0 - NO PAIN

## 2025-07-11 ASSESSMENT — LIFESTYLE VARIABLES
HOW MANY STANDARD DRINKS CONTAINING ALCOHOL DO YOU HAVE ON A TYPICAL DAY: PATIENT DOES NOT DRINK
SKIP TO QUESTIONS 9-10: 1
AUDIT-C TOTAL SCORE: 0
HOW OFTEN DO YOU HAVE A DRINK CONTAINING ALCOHOL: NEVER
AUDIT-C TOTAL SCORE: 0
HOW OFTEN DO YOU HAVE 6 OR MORE DRINKS ON ONE OCCASION: NEVER

## 2025-07-11 ASSESSMENT — PATIENT HEALTH QUESTIONNAIRE - PHQ9
2. FEELING DOWN, DEPRESSED OR HOPELESS: NOT AT ALL
1. LITTLE INTEREST OR PLEASURE IN DOING THINGS: NOT AT ALL
SUM OF ALL RESPONSES TO PHQ9 QUESTIONS 1 & 2: 0

## 2025-07-11 ASSESSMENT — ACTIVITIES OF DAILY LIVING (ADL): LACK_OF_TRANSPORTATION: NO

## 2025-07-12 ENCOUNTER — ANESTHESIA (OUTPATIENT)
Dept: OBSTETRICS AND GYNECOLOGY | Facility: HOSPITAL | Age: 30
End: 2025-07-12
Payer: COMMERCIAL

## 2025-07-12 ENCOUNTER — ANESTHESIA EVENT (OUTPATIENT)
Dept: OBSTETRICS AND GYNECOLOGY | Facility: HOSPITAL | Age: 30
End: 2025-07-12
Payer: COMMERCIAL

## 2025-07-12 PROBLEM — Z3A.39 39 WEEKS GESTATION OF PREGNANCY (HHS-HCC): Status: ACTIVE | Noted: 2025-07-12

## 2025-07-12 LAB — TREPONEMA PALLIDUM IGG+IGM AB [PRESENCE] IN SERUM OR PLASMA BY IMMUNOASSAY: NONREACTIVE

## 2025-07-12 PROCEDURE — 51701 INSERT BLADDER CATHETER: CPT

## 2025-07-12 PROCEDURE — 2500000004 HC RX 250 GENERAL PHARMACY W/ HCPCS (ALT 636 FOR OP/ED): Performed by: NURSE PRACTITIONER

## 2025-07-12 PROCEDURE — 3700000014 EPIDURAL BLOCK: Performed by: NURSE ANESTHETIST, CERTIFIED REGISTERED

## 2025-07-12 PROCEDURE — 1100000001 HC PRIVATE ROOM DAILY

## 2025-07-12 PROCEDURE — 7100000016 HC LABOR RECOVERY PER HOUR

## 2025-07-12 PROCEDURE — 2500000005 HC RX 250 GENERAL PHARMACY W/O HCPCS: Performed by: ADVANCED PRACTICE MIDWIFE

## 2025-07-12 PROCEDURE — 2500000001 HC RX 250 WO HCPCS SELF ADMINISTERED DRUGS (ALT 637 FOR MEDICARE OP): Performed by: ADVANCED PRACTICE MIDWIFE

## 2025-07-12 PROCEDURE — 0KQM0ZZ REPAIR PERINEUM MUSCLE, OPEN APPROACH: ICD-10-PCS | Performed by: ADVANCED PRACTICE MIDWIFE

## 2025-07-12 PROCEDURE — 2500000004 HC RX 250 GENERAL PHARMACY W/ HCPCS (ALT 636 FOR OP/ED): Performed by: NURSE ANESTHETIST, CERTIFIED REGISTERED

## 2025-07-12 PROCEDURE — 59409 OBSTETRICAL CARE: CPT | Performed by: ADVANCED PRACTICE MIDWIFE

## 2025-07-12 PROCEDURE — 2500000004 HC RX 250 GENERAL PHARMACY W/ HCPCS (ALT 636 FOR OP/ED): Performed by: ADVANCED PRACTICE MIDWIFE

## 2025-07-12 PROCEDURE — 59400 OBSTETRICAL CARE: CPT | Performed by: ADVANCED PRACTICE MIDWIFE

## 2025-07-12 RX ORDER — OXYTOCIN 10 [USP'U]/ML
10 INJECTION, SOLUTION INTRAMUSCULAR; INTRAVENOUS ONCE AS NEEDED
Status: DISCONTINUED | OUTPATIENT
Start: 2025-07-12 | End: 2025-07-14 | Stop reason: HOSPADM

## 2025-07-12 RX ORDER — ONDANSETRON HYDROCHLORIDE 2 MG/ML
4 INJECTION, SOLUTION INTRAVENOUS EVERY 6 HOURS PRN
Status: DISCONTINUED | OUTPATIENT
Start: 2025-07-12 | End: 2025-07-14 | Stop reason: HOSPADM

## 2025-07-12 RX ORDER — SIMETHICONE 80 MG
80 TABLET,CHEWABLE ORAL 4 TIMES DAILY PRN
Status: DISCONTINUED | OUTPATIENT
Start: 2025-07-12 | End: 2025-07-14 | Stop reason: HOSPADM

## 2025-07-12 RX ORDER — LABETALOL HYDROCHLORIDE 5 MG/ML
20 INJECTION, SOLUTION INTRAVENOUS ONCE AS NEEDED
Status: DISCONTINUED | OUTPATIENT
Start: 2025-07-12 | End: 2025-07-14 | Stop reason: HOSPADM

## 2025-07-12 RX ORDER — ENOXAPARIN SODIUM 100 MG/ML
40 INJECTION SUBCUTANEOUS EVERY 24 HOURS
Status: DISCONTINUED | OUTPATIENT
Start: 2025-07-12 | End: 2025-07-14 | Stop reason: HOSPADM

## 2025-07-12 RX ORDER — FENTANYL/ROPIVACAINE/NS/PF 2MCG/ML-.2
0-25 PLASTIC BAG, INJECTION (ML) INJECTION CONTINUOUS
Status: DISCONTINUED | OUTPATIENT
Start: 2025-07-12 | End: 2025-07-12

## 2025-07-12 RX ORDER — DIPHENHYDRAMINE HYDROCHLORIDE 50 MG/ML
25 INJECTION, SOLUTION INTRAMUSCULAR; INTRAVENOUS EVERY 6 HOURS PRN
Status: DISCONTINUED | OUTPATIENT
Start: 2025-07-12 | End: 2025-07-14 | Stop reason: HOSPADM

## 2025-07-12 RX ORDER — OXYTOCIN/0.9 % SODIUM CHLORIDE 30/500 ML
2-30 PLASTIC BAG, INJECTION (ML) INTRAVENOUS CONTINUOUS
Status: DISCONTINUED | OUTPATIENT
Start: 2025-07-12 | End: 2025-07-12

## 2025-07-12 RX ORDER — ACETAMINOPHEN 325 MG/1
975 TABLET ORAL EVERY 6 HOURS
Status: DISCONTINUED | OUTPATIENT
Start: 2025-07-12 | End: 2025-07-14 | Stop reason: HOSPADM

## 2025-07-12 RX ORDER — MISOPROSTOL 200 UG/1
800 TABLET ORAL ONCE AS NEEDED
Status: DISCONTINUED | OUTPATIENT
Start: 2025-07-12 | End: 2025-07-14 | Stop reason: HOSPADM

## 2025-07-12 RX ORDER — LIDOCAINE HYDROCHLORIDE 10 MG/ML
INJECTION, SOLUTION INFILTRATION; PERINEURAL AS NEEDED
Status: DISCONTINUED | OUTPATIENT
Start: 2025-07-12 | End: 2025-07-12

## 2025-07-12 RX ORDER — POLYETHYLENE GLYCOL 3350 17 G/17G
17 POWDER, FOR SOLUTION ORAL 2 TIMES DAILY PRN
Status: DISCONTINUED | OUTPATIENT
Start: 2025-07-12 | End: 2025-07-14 | Stop reason: HOSPADM

## 2025-07-12 RX ORDER — DIPHENHYDRAMINE HCL 25 MG
25 CAPSULE ORAL EVERY 6 HOURS PRN
Status: DISCONTINUED | OUTPATIENT
Start: 2025-07-12 | End: 2025-07-14 | Stop reason: HOSPADM

## 2025-07-12 RX ORDER — TRANEXAMIC ACID 1 G/10ML
1000 INJECTION, SOLUTION INTRAVENOUS ONCE AS NEEDED
Status: DISCONTINUED | OUTPATIENT
Start: 2025-07-12 | End: 2025-07-14 | Stop reason: HOSPADM

## 2025-07-12 RX ORDER — ADHESIVE BANDAGE
10 BANDAGE TOPICAL
Status: DISCONTINUED | OUTPATIENT
Start: 2025-07-12 | End: 2025-07-14 | Stop reason: HOSPADM

## 2025-07-12 RX ORDER — ONDANSETRON 4 MG/1
4 TABLET, FILM COATED ORAL EVERY 6 HOURS PRN
Status: DISCONTINUED | OUTPATIENT
Start: 2025-07-12 | End: 2025-07-14 | Stop reason: HOSPADM

## 2025-07-12 RX ORDER — METHYLERGONOVINE MALEATE 0.2 MG/ML
0.2 INJECTION INTRAVENOUS ONCE AS NEEDED
Status: DISCONTINUED | OUTPATIENT
Start: 2025-07-12 | End: 2025-07-14 | Stop reason: HOSPADM

## 2025-07-12 RX ORDER — IBUPROFEN 600 MG/1
600 TABLET, FILM COATED ORAL EVERY 6 HOURS
Status: DISCONTINUED | OUTPATIENT
Start: 2025-07-12 | End: 2025-07-14 | Stop reason: HOSPADM

## 2025-07-12 RX ORDER — LIDOCAINE HCL/EPINEPHRINE/PF 2%-1:200K
VIAL (ML) INJECTION AS NEEDED
Status: DISCONTINUED | OUTPATIENT
Start: 2025-07-12 | End: 2025-07-12

## 2025-07-12 RX ORDER — CARBOPROST TROMETHAMINE 250 UG/ML
250 INJECTION, SOLUTION INTRAMUSCULAR ONCE AS NEEDED
Status: DISCONTINUED | OUTPATIENT
Start: 2025-07-12 | End: 2025-07-14 | Stop reason: HOSPADM

## 2025-07-12 RX ORDER — OXYTOCIN/0.9 % SODIUM CHLORIDE 30/500 ML
60 PLASTIC BAG, INJECTION (ML) INTRAVENOUS ONCE AS NEEDED
Status: DISCONTINUED | OUTPATIENT
Start: 2025-07-12 | End: 2025-07-14 | Stop reason: HOSPADM

## 2025-07-12 RX ORDER — LOPERAMIDE HYDROCHLORIDE 2 MG/1
4 CAPSULE ORAL EVERY 2 HOUR PRN
Status: DISCONTINUED | OUTPATIENT
Start: 2025-07-12 | End: 2025-07-14 | Stop reason: HOSPADM

## 2025-07-12 RX ORDER — HYDRALAZINE HYDROCHLORIDE 20 MG/ML
5 INJECTION INTRAMUSCULAR; INTRAVENOUS ONCE AS NEEDED
Status: DISCONTINUED | OUTPATIENT
Start: 2025-07-12 | End: 2025-07-14 | Stop reason: HOSPADM

## 2025-07-12 RX ADMIN — Medication 1 EACH: at 15:30

## 2025-07-12 RX ADMIN — Medication 10 ML/HR: at 01:25

## 2025-07-12 RX ADMIN — ENOXAPARIN SODIUM 40 MG: 40 INJECTION SUBCUTANEOUS at 21:08

## 2025-07-12 RX ADMIN — ACETAMINOPHEN 975 MG: 325 TABLET ORAL at 15:30

## 2025-07-12 RX ADMIN — BENZOCAINE AND LEVOMENTHOL 1 APPLICATION: 200; 5 SPRAY TOPICAL at 15:30

## 2025-07-12 RX ADMIN — Medication 60 MILLI-UNITS/MIN: at 08:27

## 2025-07-12 RX ADMIN — IBUPROFEN 600 MG: 600 TABLET ORAL at 21:08

## 2025-07-12 RX ADMIN — SODIUM CHLORIDE, SODIUM LACTATE, POTASSIUM CHLORIDE, AND CALCIUM CHLORIDE 500 ML: .6; .31; .03; .02 INJECTION, SOLUTION INTRAVENOUS at 00:46

## 2025-07-12 RX ADMIN — Medication 2 MILLI-UNITS/MIN: at 02:34

## 2025-07-12 RX ADMIN — SODIUM CHLORIDE, SODIUM LACTATE, POTASSIUM CHLORIDE, AND CALCIUM CHLORIDE 75 ML/HR: .6; .31; .03; .02 INJECTION, SOLUTION INTRAVENOUS at 01:36

## 2025-07-12 RX ADMIN — LIDOCAINE HYDROCHLORIDE,EPINEPHRINE BITARTRATE 5 ML: 20; .005 INJECTION, SOLUTION EPIDURAL; INFILTRATION; INTRACAUDAL; PERINEURAL at 01:21

## 2025-07-12 RX ADMIN — IBUPROFEN 600 MG: 600 TABLET ORAL at 15:30

## 2025-07-12 RX ADMIN — ACETAMINOPHEN 975 MG: 325 TABLET ORAL at 09:28

## 2025-07-12 RX ADMIN — LIDOCAINE HYDROCHLORIDE 3 ML: 10 INJECTION, SOLUTION INFILTRATION; PERINEURAL at 01:18

## 2025-07-12 RX ADMIN — ACETAMINOPHEN 975 MG: 325 TABLET ORAL at 21:07

## 2025-07-12 RX ADMIN — IBUPROFEN 600 MG: 600 TABLET ORAL at 09:28

## 2025-07-12 SDOH — HEALTH STABILITY: MENTAL HEALTH: CURRENT SMOKER: 0

## 2025-07-12 ASSESSMENT — PAIN SCALES - GENERAL
PAINLEVEL_OUTOF10: 3
PAIN_LEVEL: 1
PAINLEVEL_OUTOF10: 0 - NO PAIN
PAINLEVEL_OUTOF10: 5 - MODERATE PAIN

## 2025-07-12 ASSESSMENT — PAIN DESCRIPTION - DESCRIPTORS
DESCRIPTORS: CRAMPING
DESCRIPTORS: CRAMPING

## 2025-07-12 NOTE — ANESTHESIA POSTPROCEDURE EVALUATION
"Patient: Delia Hernández    Procedure Summary       Date: 07/12/25 Room / Location:     Anesthesia Start: 0115 Anesthesia Stop: 0756    Procedure: Labor Analgesia Diagnosis:     Scheduled Providers:  Responsible Provider: JOYA Tijerina    Anesthesia Type: epidural ASA Status: 2            Anesthesia Type: epidural    Vitals Value Taken Time   /68   Pulse 69   Temp 36.5 °C (97.7 °F) (Temporal)   Resp 16   Ht 1.676 m (5' 5.98\")   Wt 92.3 kg (203 lb 6 oz)   LMP 10/06/2024   SpO2 99%   Breastfeeding Yes   BMI 32.84 kg/m²      Anesthesia Post Evaluation    Patient location during evaluation: bedside  Patient participation: complete - patient participated  Level of consciousness: awake and alert  Pain score: 1  Pain management: adequate  Airway patency: patent  Cardiovascular status: acceptable  Respiratory status: acceptable  Hydration status: acceptable  Postoperative Nausea and Vomiting: none  Comments: Patient reports some motor weakness to left leg. Numbness noted to left anterior thigh. She is not able to lift her left leg or walk yet. She denies any back pain, but reports some hip discomfort. She has not urinated yet. I encouraged her to drink more fluids as she doesn't seem to be doing so. I also informed her that the above mentioned symptoms typically resolve completely, and we will follow closely. I spoke with Dr. Donovan David who agrees.         No notable events documented.    "

## 2025-07-12 NOTE — ANESTHESIA PROCEDURE NOTES
Epidural Block    Patient location during procedure: OB  Start time: 7/12/2025 1:18 AM  End time: 7/12/2025 1:23 AM  Reason for block: labor analgesia  Staffing  Performed: CRNA   Authorized by: JOYA Parra    Performed by: JOYA Parra    Preanesthetic Checklist  Completed: patient identified, IV checked, risks and benefits discussed, surgical consent, pre-op evaluation, timeout performed and sterile techniques followed  Block Timeout  RN/Licensed healthcare professional reads aloud to the Anesthesia provider and entire team: Patient identity, procedure with side and site, patient position, and as applicable the availability of implants/special equipment/special requirements.    Timeout performed at: 7/12/2025 1:18 AM  Block Placement  Patient position: sitting  Prep: ChloraPrep  Sterility prep: cap, drape, gloves, hand and mask  Sedation level: no sedation  Patient monitoring: continuous pulse oximetry and blood pressure  Approach: midline  Local numbing: lidocaine 1% to skin and subcutaneous tissues  Epidural  Loss of resistance technique: saline  Guidance: landmark technique        Needle  Needle type: Tuohy   Needle gauge: 17  Needle length: 8.9cm  Needle insertion depth: 5 cm  Catheter type: multi-orifice  Catheter size: 19 G  Catheter at skin depth: 0 cm  Catheter securement method: clear occlusive dressing and surgical tape    Test dose: lidocaine 1.5% with epinephrine 1-to-200,000  Test dose: lidocaine 1.5% with epinephrine 1-to-200,000  Test dose result: no positive test dose    PCEA  Medication concentration used: 0.2% Ropivacaine with 2 mcg/mL Fentanyl  Dose (mL): 5  Lockout (minutes): 30  1-Hour Limit (boluses/hr): 25  Basal Rate: 10        Assessment  Sensory level: T10 bilateral  Block outcome: patient comfortable  Number of attempts: 1  Events: no positive test dose  Procedure assessment: patient tolerated procedure well with no immediate complications

## 2025-07-12 NOTE — PROGRESS NOTES
Intrapartum Progress Note    Assessment/Plan   Delia Hernández is a 29 y.o.  at 39w2d. BROOKE: 2025, by Embryo Transfer.     Term rrIOL  Pregnancy resulting from assisted reproductive technology  Cat I EFM   GBS negative   Obesity in pregnancy     Plan:  cEFM  Frequent position changes  Cyto x1-->Pitocin   Epidural upon request due to patient's pain level   Anticipate second stage   Anticipate   Planned cord blood collection       Subjective     Pt is now comfortable with epidural, increased pressure and I was called bedside for concerns of being unable to straight cath due to station of fetal head. No repeat cervical exam since placing the epidural.     Objective   Last Vitals:  Temp Pulse Resp BP MAP Pulse Ox   36.8 °C (98.2 °F) 94 19 114/59 79 100 %     Vitals Min/Max Last 24 Hours:  Temp  Min: 36.6 °C (97.9 °F)  Max: 36.8 °C (98.2 °F)  Pulse  Min: 61  Max: 94  Resp  Min: 17  Max: 19  BP  Min: 109/57  Max: 124/64  MAP (mmHg)  Min: 79  Max: 90    Intake/Output:    Intake/Output Summary (Last 24 hours) at 2025 0527  Last data filed at 2025 0515  Gross per 24 hour   Intake 1000 ml   Output 750 ml   Net 250 ml       Physical Examination:  Straight cath placed   FHR is  140 BPM, moderate variability, with Accelerations, Variable decelerations, and a Cat I   tracing.    [place] reading:   every 2-4 minutes  CERVIX: 10 cm dilated, 100 % effaced, 1 station;   No palpable membranes, unable to determine AROM   Chaperone Present: Yes.  Chaperone Name/Title: RN      Lab Review:  Lab Results   Component Value Date    ABO O 2025    LABRH POS 2025    ABSCRN NEG 2025     Lab Results   Component Value Date    WBC 8.3 2025    HGB 11.8 (L) 2025    HCT 36.0 2025     2025     0   Lab Value Date/Time    GRPBSTREP SEE NOTE 2025 0916

## 2025-07-12 NOTE — ANESTHESIA PREPROCEDURE EVALUATION
Patient: Delia Hernández    Evaluation Method: In-person visit    Procedure Information    Date: 07/11/25  Procedure: Labor Consult         Relevant Problems   Endocrine   (+) Subclinical hypothyroidism      Hematology   (+) Mild anemia      GYN   (+) Encounter for pregnancy related examination, antepartum   (+) Multigravida in third trimester ([facility]-HCC)   (+) PCOS (polycystic ovarian syndrome)   (+) Pregnancy resulting from assisted reproductive technology, antepartum ([facility])       Clinical information reviewed:    Allergies  Meds               NPO Detail:  NPO/Void Status  Date of Last Liquid: 07/11/25  Time of Last Liquid: 1200  Date of Last Solid: 07/11/25  Time of Last Solid: 1400         OB/Gyn Evaluation    Present Pregnancy    Patient is pregnant now.   Obstetric History                Physical Exam    Airway  Mallampati: II  TM distance: >3 FB  Neck ROM: full  Mouth opening: 3 or more finger widths     Cardiovascular - normal exam   Dental    Pulmonary - normal exam   Abdominal - normal exam  Comments: gravid           Anesthesia Plan    History of general anesthesia?: yes  History of complications of general anesthesia?: no    ASA 2     epidural     The patient is not a current smoker.    Anesthetic plan and risks discussed with patient and spouse.  Use of blood products discussed with patient and spouse who consented to blood products.    Plan discussed with CRNA.

## 2025-07-12 NOTE — LACTATION NOTE
Lactation Consultant Note  Lactation Consultation  Reason for Consult: Initial assessment  Consultant Name: Aisha ADHIKARI    Maternal Information  Has mother  before?: No  Infant to breast within first 2 hours of birth?: Yes  Exclusive Pump and Bottle Feed: No    Maternal Assessment  Breast Assessment: Large, Soft, Compressible  Nipple Assessment: Intact, Erect  [place] Assessment: Normal    Infant Assessment  Infant Behavior: Awake, Readiness to feed, Feeding cues observed, Rooting response  Infant Assessment: Good cupping of tongue (disorganized initially improved with suck training)    Feeding Assessment  Nutrition Source: Breastmilk  Feeding Method: Nursing at the breast  Feeding Position: Football/seated, Mother needs assistance with latch/positioning  Suck/Feeding: Sustained, Audible swallowing  Latch Assessment: Moderate assistance is needed, Latch achieved after repeated attempts, Sucks with long jaw movement    LATCH TOOL  Latch: Repeated attempts, hold nipple in mouth, stimulate to suck  Audible Swallowing: A few with stimulation  Type of Nipple: Everted (After stimulation)  Comfort (Breast/Nipple): Soft/non-tender  Hold (Positioning): Minimal assist, teach one side, mother does other, staff holds  LATCH Score: 7    Breast Pump       Other OB Lactation Tools       Patient Follow-up  Inpatient Lactation Follow-up Needed : Yes    Other OB Lactation Documentation  Maternal Risk Factors: Age over 30, primiparity    Recommendations/Summary  Assisted with latch to the right side in fooball hold. Baby was a bit disorganized initially, but eventually latched deeply and fed well, sucking with long jaw movement with swallows noted. Reviewed milk supply patterns and  feeding patterns in the fist and second 24 HOL.Mom was asked to attempt/feed baby at least every 2-3 hours or on demand with a goal of 8-12 feeds daily. Feeding cues reviewed.Breastfeeding education reviewed and questions answered. Mom aware of  lactation support and asked to call out for feed assistance and with questions as needed.

## 2025-07-12 NOTE — CARE PLAN
The patient's goals for the shift include Have a healthy delivery    The clinical goals for the shift include Maternal and Fetal HR will remain WDL, by the end of this shift.    Patient made progress to goal and is pushing

## 2025-07-12 NOTE — PROGRESS NOTES
Intrapartum Progress Note    Assessment/Plan   Delia Hernández is a 29 y.o.  at 39w2d. BROOKE: 2025, by Embryo Transfer.     Term rrIOL  Pregnancy resulting from assisted reproductive technology  Cat I EFM   GBS negative   Obesity in pregnancy     Plan:  cEFM  Frequent position changes  Cyto x1-->Pitocin   Epidural upon request due to patient's pain level   Anticipate active labor       Subjective     Pt is now breathing through contractions, partner at bedside and overall she is coping well and agreeable to SVE.    Objective   Last Vitals:  Temp Pulse Resp BP MAP Pulse Ox   36.6 °C (97.9 °F) 81 18 123/58 84 100 %     Vitals Min/Max Last 24 Hours:  Temp  Min: 36.6 °C (97.9 °F)  Max: 36.6 °C (97.9 °F)  Pulse  Min: 70  Max: 91  Resp  Min: 17  Max: 18  BP  Min: 112/60  Max: 123/58  MAP (mmHg)  Min: 83  Max: 84    Intake/Output:    Intake/Output Summary (Last 24 hours) at 2025 0024  Last data filed at 2025 2241  Gross per 24 hour   Intake 500 ml   Output --   Net 500 ml       Physical Examination:  FHR is  145 , moderate variability, with Accelerations, and a Cat I   tracing.    [place] reading:   every 2-4 minutes  CERVIX: 1 cm dilated, 90 % effaced, 0 station; MEMBRANES are Intact    Chaperone Present: Yes.  Chaperone Name/Title: RN      Lab Review:  Lab Results   Component Value Date    ABO O 2025    LABRH POS 2025    ABSCRN NEG 2025     Lab Results   Component Value Date    WBC 8.3 2025    HGB 11.8 (L) 2025    HCT 36.0 2025     2025     0   Lab Value Date/Time    GRPBSTREP SEE NOTE 2025 0944

## 2025-07-12 NOTE — L&D DELIVERY NOTE
OB Vaginal Delivery Note    2025  Delia Hernández  29 y.o.    Review the Delivery Report for details.     Gestational Age: 39w2d  /Para:   Labor Complications: None  Estimated Blood Loss:   Delivery Blood Loss   Intrapartum & Postpartum: 25 - 25 0849    Delivery Admission: 25 1845 - 25 0849         Intrapartum & Postpartum Delivery Admission    None             Quantitative Blood Loss:    Delivery Type: Vaginal, Spontaneous  ROM to Delivery Time: 2h 41m   Sex: Female   Weight:     1 Minute 5 Minute 10 Minute   Apgar Totals: 8   9          Andi Hernández [[id number]]      Delivery Providers    Delivering clinician: MATA Daniels-HILLARY   Provider Role    Jennifer Garza, DAILY Delivery Nurse    Adriana Espinoza, RN Nursery Nurse     Resident               Delivery Details:  Delia Hernández, a 29 y.o.  female delivered a viable Female infant with Apgars of 8  and 9 . Patient was fully dilated and pushing in active labor. The patient was put in the dorsal lithotomy position. Delivery was via Vaginal, Spontaneous to a sterile field under Epidural anesthesia. Infant delivered in Vertex Middle Occiput Anterior position. Anterior and posterior shoulders delivered without difficulty. Meconium present upon delivery. The cord was clamped twice, cut and 3 vessels were noted. Cord blood was obtained in routine fashion with the following disposition: Lab . Intact placenta delivered at 2025  8:02 AM. Placental disposition was pathology. Fundal massage performed and fundus found to be firm. Perineum, vagina, cervix were inspected, and the following lacerations were noted:   Andi Hernández [[id number]]      Lacerations    Episiotomy: None  Perineal laceration: 2nd  Perineal laceration repaired?: Yes  Sulcal laceration?: Yes  Sulcal laceration location: left  Sulcal laceration repaired?: Yes  Repair suture: 3-0 Synthetic Suture            Right  sulcal tear present and hemostatic. Small left sulcal tear repaired with lock stitch. Second degree laceration were repaired in the usual fashion using 3-0 Synthetic Suture. Excellent hemostasis was noted. Needle count correct. Infant and patient in delivery room in good and stable condition. EBL: 350mL    Kimberly Comer, APRN-CNM

## 2025-07-12 NOTE — H&P
OB Admission H&P    Assessment/Plan    Delia Hernández is a 29 y.o.  at 39w1d, BROOKE: 2025, by Embryo Transfer, who presents for Induction of Labor.    Plan  -Admit to L&D, consented  -T&S, CBC, and Syphilis  -Epidural at patient request  -Recheck as clinically indicated by maternal or fetal status  -Plan to initiate induction with cytotec    Fetal Status  -NST reactive, reassuring   -Presentation vertex based on ultrasound  -EFW 3500g by ultrasound extrapalation  -GBS neg    Postpartum  Contraception Plan: patient declined    Pregnancy Problems (from 24 to present)       Problem Noted Diagnosed Resolved    Pregnancy resulting from assisted reproductive technology, antepartum ([facility]) 2025 by Husam Jimenez MD  No    Priority:  Medium       Overview Addendum 2025  9:52 AM by Husam Jimenez MD   Growth US   30w EFW 41%  36w EFW 36% appropriate interval growth  Weekly NSTs at 36w         Encounter for pregnancy related examination, antepartum 2025 by Husam Jimenez MD  No    Priority:  Medium       Overview Addendum 2025  9:56 PM by Husam Jimenez MD     [x] Accepts Blood Products: Yes  [x] Initial BMI: 28  [x] Prenatal Labs: Hgb id possible alpha thalassemia  [x] Last pap: 2021 Negative, requests to defer to until after delivery  [x] Genetic Screening:  Had PGT-A, declines cfDNA screen, NT subjectively normal  [x] Fetal Sex: It's a girl!  [x] Baby ASA: Yes  [x] Pregnancy dated by: IVF    [x] Anatomy US: Normal  [x] 1hr GCT at 24-28wks: Abnormal, schedule 3hr  -See notes, 3 weeks of monitoring wnl  [x] Rhogam: O Positive  [x] Tdap Vaccine: Done  [] RSV (32-36 wks Sep-):   [x] Flu Vaccine: Declines    [x] GBS: Negative  [x] Labor preferences: Undecided  [x] 39 weeks discussion of IOL vs. Expectant management: Discussed ARRIVE trial.  Induction scheduled on  during visit with Elizabeth Vogt. Discussed normal course of induction   [x] Mode of delivery: Vaginal   [x]  Breastfeeding: Yes  [] Postpartum Birth control method:                  Mild anemia 2024 by JUSTO Hernandez  No    Priority:  Medium       Overview Signed 2024  8:56 AM by JUSTO Hernandez   Mild AAYN vs. Alpha thalassemia         Subclinical hypothyroidism 12/10/2024 by JUSTO Hernandez  No    Priority:  Medium       Overview Addendum 2025  4:51 PM by Husam Jimenez MD   Check TSH qtrimester  Synthroid 100mcg daily  1st Tri .44  2nd Tri .96   3rd Tri .99          Encounter for pregnancy related examination, antepartum 2025 by Husam Jimenez MD  2025 by Husam Jimenez MD    Priority:  Medium       8 weeks gestation of pregnancy ([facility]) 12/10/2024 by JUSTO Hernandez  2025 by Husam Jimenez MD    Priority:  Medium       Overview Signed 12/10/2024  3:29 PM by JUSTO Hernandez   Desired provider in labor: [] CNM  [] Physician  [] Blood Products: [] Yes, accepts [] No, needs counseling  [x] Initial BMI: 28.42   [] Prenatal Labs:   [] Cervical Cancer Screening up to date  [x] Rh status: O+  [] Genetic Screening:    [] NT US: (11-13 wks)  [] Baby ASA (if indicated):  [x] Pregnancy dated by: IVF embryo transfer    [] Anatomy US: (19-20 wks)  [] Federal Sterilization consent signed (if indicated):  [] 1hr GCT at 24-28wks:  [] Rhogam (if indicated):   [] Fetal Surveillance (if indicated):  [] Tdap (27-32 wks, may be given up to 36 wks if initial window missed):   [] RSV (32-36 wks) (Sept. to end of ):   [] Flu Vaccine:    [] Breastfeeding:  [] Postpartum Birth control method:   [] GBS at 36 - 37 wks:  [] 39 weeks discussion of IOL vs. Expectant management:  [] Mode of delivery ( anticipated ):                  Subjective   Good fetal movement.  Denies vaginal bleeding., Denies contractions., Denies leaking of fluid.      Prenatal Provider Tony    OB History    Para Term  AB Living   3 0 0 0 2 0   SAB IAB  Ectopic Multiple Live Births   2 0 0 0 0      # Outcome Date GA Lbr Justin/2nd Weight Sex Type Anes PTL Lv   3 Current            2 SAB 2023 7w0d    Incomplete S      1 SAB 2021 6w0d    SAB          Surgical History[1]    Social History     Tobacco Use    Smoking status: Never    Smokeless tobacco: Never   Substance Use Topics    Alcohol use: Not Currently     Comment: not in 5 years       Allergies[2]    Prescriptions Prior to Admission[3]  Objective     Last Vitals  Temp Pulse Resp BP MAP O2 Sat   36.6 °C (97.9 °F) 91 17 112/60 83 98 %     Blood Pressures         7/11/2025 2000             BP: 112/60             Physical Exam  General: NAD, mood appropriate  Cardiopulmonary: warm and well perfused, breathing comfortably on room air  Abdomen: Gravid, non-tender  Extremities: Symmetric    Cervix: cl/80/-2       Fetal Monitoring  Baseline: 150 bpm, Variability: moderate,  Accelerations: present and Decelerations: none  Uterine Activity: No contractions seen on toco  Interpretation: Reactive    Bedside ultrasound: Yes    Labs in chart were reviewed.  0   Lab Value Date/Time    GRPBSTREP SEE NOTE 06/25/2025 0944     CBC             Prenatal labs reviewed, not remarkable.             [1]   Past Surgical History:  Procedure Laterality Date    ANAL FISTULOTOMY  2023    DILATION AND CURETTAGE OF UTERUS      OTHER SURGICAL HISTORY  08/15/2024    Oocyte Retrieval   [2] No Known Allergies  [3]   Medications Prior to Admission   Medication Sig Dispense Refill Last Dose/Taking    aspirin 81 mg EC tablet Take 1 tablet (81 mg) by mouth once daily. 30 tablet 2 7/11/2025 Morning    blood sugar diagnostic (Blood Glucose Test) Use 1 strip 4-6 times a day during pregnancy 150 each 3 Unknown    blood-glucose meter misc Please check blood sugar 4 times per day. Once in the morning before eating breakfast and 1 hour after each meal. Breakfast, lunch and dinner. 1 each 0 Unknown    cholecalciferol (Vitamin D-3) 50 mcg (2,000 unit) capsule  Take by mouth.   7/11/2025 Morning    lancets misc Please check blood sugar 4 times per day. Once in the morning before eating breakfast and 1 hour after each meal. Breakfast, lunch and dinner. Use new needle with each stick. 50 each 0 Unknown    levothyroxine (Synthroid, Levoxyl) 50 mcg tablet Take 2 tablets (100 mcg) by mouth once daily. 180 tablet 3 7/11/2025 Morning    -iron fum-folic acid (Prenatal 19) 29 mg iron- 1 mg tablet Take by mouth.   7/11/2025 Morning    alcohol swabs Use to clean finger before each stick. 40 each 0 Unknown

## 2025-07-13 VITALS
BODY MASS INDEX: 32.68 KG/M2 | HEIGHT: 66 IN | OXYGEN SATURATION: 99 % | TEMPERATURE: 98.4 F | SYSTOLIC BLOOD PRESSURE: 120 MMHG | DIASTOLIC BLOOD PRESSURE: 70 MMHG | RESPIRATION RATE: 18 BRPM | HEART RATE: 77 BPM | WEIGHT: 203.37 LBS

## 2025-07-13 PROBLEM — O24.419 GESTATIONAL DIABETES MELLITUS (GDM), ANTEPARTUM (HHS-HCC): Status: ACTIVE | Noted: 2025-07-13

## 2025-07-13 PROCEDURE — 1100000001 HC PRIVATE ROOM DAILY

## 2025-07-13 PROCEDURE — 2500000001 HC RX 250 WO HCPCS SELF ADMINISTERED DRUGS (ALT 637 FOR MEDICARE OP): Performed by: ADVANCED PRACTICE MIDWIFE

## 2025-07-13 PROCEDURE — 2500000002 HC RX 250 W HCPCS SELF ADMINISTERED DRUGS (ALT 637 FOR MEDICARE OP, ALT 636 FOR OP/ED): Performed by: ADVANCED PRACTICE MIDWIFE

## 2025-07-13 PROCEDURE — 2500000004 HC RX 250 GENERAL PHARMACY W/ HCPCS (ALT 636 FOR OP/ED): Performed by: ADVANCED PRACTICE MIDWIFE

## 2025-07-13 PROCEDURE — 88307 TISSUE EXAM BY PATHOLOGIST: CPT | Mod: TC,AHULAB | Performed by: OBSTETRICS & GYNECOLOGY

## 2025-07-13 RX ORDER — METOCLOPRAMIDE 10 MG/1
10 TABLET ORAL EVERY 8 HOURS
Status: DISCONTINUED | OUTPATIENT
Start: 2025-07-13 | End: 2025-07-14 | Stop reason: HOSPADM

## 2025-07-13 RX ADMIN — ACETAMINOPHEN 975 MG: 325 TABLET ORAL at 03:08

## 2025-07-13 RX ADMIN — IBUPROFEN 600 MG: 600 TABLET ORAL at 08:58

## 2025-07-13 RX ADMIN — ACETAMINOPHEN 975 MG: 325 TABLET ORAL at 08:58

## 2025-07-13 RX ADMIN — IBUPROFEN 600 MG: 600 TABLET ORAL at 21:09

## 2025-07-13 RX ADMIN — LEVOTHYROXINE SODIUM 100 MCG: 0.03 TABLET ORAL at 06:14

## 2025-07-13 RX ADMIN — ACETAMINOPHEN 975 MG: 325 TABLET ORAL at 21:09

## 2025-07-13 RX ADMIN — ENOXAPARIN SODIUM 40 MG: 40 INJECTION SUBCUTANEOUS at 21:09

## 2025-07-13 RX ADMIN — IBUPROFEN 600 MG: 600 TABLET ORAL at 03:09

## 2025-07-13 ASSESSMENT — PAIN DESCRIPTION - DESCRIPTORS
DESCRIPTORS: DISCOMFORT
DESCRIPTORS: DISCOMFORT

## 2025-07-13 ASSESSMENT — PAIN SCALES - GENERAL
PAINLEVEL_OUTOF10: 0 - NO PAIN
PAINLEVEL_OUTOF10: 0 - NO PAIN
PAINLEVEL_OUTOF10: 5 - MODERATE PAIN
PAINLEVEL_OUTOF10: 5 - MODERATE PAIN

## 2025-07-13 NOTE — PROGRESS NOTES
Examined pt who is out of bed walking around hospital room and states she has had a complete resolution of the lower extremity motor weakness and numbness she complained of after delivery. No other complaints at this time. Will sign off on pt care.

## 2025-07-13 NOTE — LACTATION NOTE
Lactation Consultant Note  Lactation Consultation  Reason for Consult: Follow-up assessment  Consultant Name: Aisha ADHIKARI    Maternal Information  Has mother  before?: No  Infant to breast within first 2 hours of birth?: Yes  Exclusive Pump and Bottle Feed: No    Maternal Assessment  Breast Assessment: Large, Soft, Compressible  Nipple Assessment: Intact, Erect  Areola Assessment: Normal    Infant Assessment  Infant Behavior: Awake  Infant Assessment: Good cupping of tongue (disorganized at times)    Feeding Assessment  Nutrition Source: Breastmilk  Feeding Method: Nursing at the breast  Feeding Position: Cross - cradle, Mother needs assistance with latch/positioning, Football/seated  Suck/Feeding: Sustained, Audible swallowing  Latch Assessment: Minimal assistance is needed, Pain during feeding, Deep latch obtained    LATCH TOOL  Latch: Repeated attempts, hold nipple in mouth, stimulate to suck  Audible Swallowing: A few with stimulation  Type of Nipple: Everted (After stimulation)  Comfort (Breast/Nipple): Filling, red/small blisters/bruises, mild/moderate discomfort  Hold (Positioning): Minimal assist, teach one side, mother does other, staff holds  LATCH Score: 6    Breast Pump  Pump: Hospital grade electric pump  Frequency: 8-10 times per day  Breast Shield Size and Type: 21 mm    Other OB Lactation Tools       Patient Follow-up  Inpatient Lactation Follow-up Needed : Yes    Other OB Lactation Documentation  Maternal Risk Factors: Age over 30, primiparity    Recommendations/Summary  Assisted with latch to the left side in both football and in cross cradle hold. Baby latched in both positions but seems to latch shallow initially. Deep breast compression held for baby while she latched to help her sustain a deep latch once latched baby fed for 15 minutes parents reported that they latched baby independently on the other side as well. Baby was fed formula for increased bilirubin and sleepiness yesterday.  Baby was fed formula overnight. Mom and dad would like to work on latching at this time. Mom was encouraged to pump after feeds. Formula will be introduced if needed. Parents will call for latch assistance. Mom was asked to attempt/feed baby at least every 2-3 hours or on demand with a goal of 8-12 feeds daily. Feeding cues reviewed.Breastfeeding education reviewed and questions answered. Mom aware of lactation support and asked to call out for feed assistance and with questions as needed.    1600-Assisted with latch to the right side in cross cradle and in football hold. Baby latched after a few tries and after initially latching shallow. Baby sustained a deep latch and fed for 10 minutes with some swallows noted.

## 2025-07-13 NOTE — PROGRESS NOTES
Postpartum Progress Note    Assessment/Plan   Delia Henrández is a 29 y.o., , who delivered at 39w2d gestation and is now postpartum day 1.    Doing well. She had some LE weakness and hip pain that has spontaneously improved. Exam benign.   Reglan TID started to assist with improved lactation.    Subjective   Her pain is well controlled with current medications  She is passing flatus  She is ambulating well  She is tolerating a Adult diet Regular  She reports no breast or nursing problems  She denies emotional concerns today   Her plan for contraception is undecided         Objective     Last Vitals:  Temp Pulse Resp BP MAP Pulse Ox   36.6 °C (97.9 °F) 74 18 92/60 65 98 %     Vitals Min/Max Last 24 Hours:  Temp  Min: 36.5 °C (97.7 °F)  Max: 36.7 °C (98.1 °F)  Pulse  Min: 66  Max: 105  Resp  Min: 16  Max: 18  BP  Min: 90/60  Max: 114/76  MAP (mmHg)  Min: 65  Max: 87    Intake/Output:     Intake/Output Summary (Last 24 hours) at 2025 1216  Last data filed at 2025 2250  Gross per 24 hour   Intake --   Output 350 ml   Net -350 ml       Physical Exam:  General: A&Ox3  Head: Normocephalic, atraumatic  Heart/Lungs: Even chest rise, no increased work of breathing.  Breast: Normal in appearance bilaterally. No skin changes. No rashes or bruises. No palpable masses.  Abdomen: Soft, nontender. BS+4. No bruising or masses.  Uterus is 2 below umbilicus.  Lower Extremities: No lower extremity Edema no palpable cords.       Lab Data:  Labs in chart were reviewed.

## 2025-07-13 NOTE — CARE PLAN
Problem: Safety - Adult  Goal: Free from fall injury  7/13/2025 1715 by Ricarda Elder RN  Outcome: Progressing  7/13/2025 1714 by Ricarda Elder RN  Outcome: Progressing     Problem: Fall/Injury  Goal: Not fall by end of shift  7/13/2025 1715 by Ricarda Elder RN  Outcome: Progressing  7/13/2025 1714 by Ricarda Elder RN  Outcome: Progressing  Goal: Be free from injury by end of the shift  7/13/2025 1715 by Ricarda Elder RN  Outcome: Progressing  7/13/2025 1714 by Ricarda Elder RN  Outcome: Progressing     The patient's goals for the shift include safety    The clinical goals for the shift include bonding/feeding/skin to skin

## 2025-07-14 ENCOUNTER — PHARMACY VISIT (OUTPATIENT)
Dept: PHARMACY | Facility: CLINIC | Age: 30
End: 2025-07-14
Payer: COMMERCIAL

## 2025-07-14 VITALS
WEIGHT: 203.37 LBS | OXYGEN SATURATION: 100 % | SYSTOLIC BLOOD PRESSURE: 105 MMHG | HEART RATE: 58 BPM | DIASTOLIC BLOOD PRESSURE: 62 MMHG | BODY MASS INDEX: 32.68 KG/M2 | TEMPERATURE: 97.3 F | HEIGHT: 66 IN | RESPIRATION RATE: 18 BRPM

## 2025-07-14 LAB — GLUCOSE BLD MANUAL STRIP-MCNC: 55 MG/DL (ref 74–99)

## 2025-07-14 PROCEDURE — 2500000002 HC RX 250 W HCPCS SELF ADMINISTERED DRUGS (ALT 637 FOR MEDICARE OP, ALT 636 FOR OP/ED): Performed by: ADVANCED PRACTICE MIDWIFE

## 2025-07-14 PROCEDURE — 82947 ASSAY GLUCOSE BLOOD QUANT: CPT

## 2025-07-14 PROCEDURE — RXMED WILLOW AMBULATORY MEDICATION CHARGE

## 2025-07-14 PROCEDURE — 2500000001 HC RX 250 WO HCPCS SELF ADMINISTERED DRUGS (ALT 637 FOR MEDICARE OP): Performed by: ADVANCED PRACTICE MIDWIFE

## 2025-07-14 RX ORDER — IBUPROFEN 600 MG/1
600 TABLET, FILM COATED ORAL EVERY 6 HOURS
Qty: 120 TABLET | Refills: 0 | Status: SHIPPED | OUTPATIENT
Start: 2025-07-14 | End: 2025-08-13

## 2025-07-14 RX ORDER — DOCUSATE SODIUM 100 MG/1
100 CAPSULE, LIQUID FILLED ORAL 2 TIMES DAILY PRN
Qty: 30 CAPSULE | Refills: 5 | Status: SHIPPED | OUTPATIENT
Start: 2025-07-14

## 2025-07-14 RX ORDER — ACETAMINOPHEN 325 MG/1
975 TABLET ORAL EVERY 6 HOURS
Qty: 360 TABLET | Refills: 0 | Status: SHIPPED | OUTPATIENT
Start: 2025-07-14 | End: 2025-08-13

## 2025-07-14 RX ADMIN — LEVOTHYROXINE SODIUM 100 MCG: 0.03 TABLET ORAL at 06:12

## 2025-07-14 RX ADMIN — IBUPROFEN 600 MG: 600 TABLET ORAL at 03:02

## 2025-07-14 RX ADMIN — IBUPROFEN 600 MG: 600 TABLET ORAL at 09:12

## 2025-07-14 RX ADMIN — ACETAMINOPHEN 975 MG: 325 TABLET ORAL at 15:29

## 2025-07-14 RX ADMIN — IBUPROFEN 600 MG: 600 TABLET ORAL at 15:29

## 2025-07-14 RX ADMIN — ACETAMINOPHEN 975 MG: 325 TABLET ORAL at 09:12

## 2025-07-14 RX ADMIN — ACETAMINOPHEN 975 MG: 325 TABLET ORAL at 03:02

## 2025-07-14 ASSESSMENT — PAIN SCALES - GENERAL
PAINLEVEL_OUTOF10: 0 - NO PAIN

## 2025-07-14 NOTE — LACTATION NOTE
Lactation Consultant Note  Lactation Consultation  Reason for Consult: Follow-up assessment  Consultant Name: Bonita Slater    Maternal Information  Has mother  before?: No  Infant to breast within first 2 hours of birth?: Yes  Exclusive Pump and Bottle Feed: No    Maternal Assessment       Infant Assessment  Infant Behavior: Deep sleep    Feeding Assessment  Nutrition Source: Breastmilk, Formula (medically indicated)  Feeding Method: Nursing at the breast, Paced bottle    LATCH TOOL       Breast Pump  Pump: Hospital grade electric pump  Frequency: 8-10 times per day  Duration: 15-20 minutes per session  Breast Shield Size and Type: 21 mm    Other OB Lactation Tools       Patient Follow-up  Inpatient Lactation Follow-up Needed : Yes    Other OB Lactation Documentation  Maternal Risk Factors: Age over 30, primiparity    Recommendations/Summary  Checked in on mom and she states latching and pumping are going well. Mom states she is working on latching for feeds, supplementing, and pumping to help bring in her milk.  Mom resting at this time and will call for latch assistance with next feed.

## 2025-07-14 NOTE — CARE PLAN
The patient's goals for the shift include I will stimulate my breasts with pump if baby feeds formula    The clinical goals for the shift include Patient will breastfeed NB every 2-3 hrs or on demand    Over the shift, the patient did make progress toward the following goals. Barriers to progression include none. Recommendations to address these barriers include none.    Problem: Vaginal Birth or  Section  Goal: Fetal and maternal status remain reassuring during the birth process  Outcome: Met  Goal: Minimal s/sx of HDP and BP<160/110  Outcome: Met  Goal: No s/sx of infection through recovery  Outcome: Met  Goal: No s/sx of hemorrhage through recovery  Outcome: Met     Problem: Safety - Adult  Goal: Free from fall injury  Outcome: Met     Problem: Fall/Injury  Goal: Not fall by end of shift  Outcome: Met  Goal: Be free from injury by end of the shift  Outcome: Met     Problem: Discharge Planning  Goal: Discharge to home or other facility with appropriate resources  Outcome: Met

## 2025-07-14 NOTE — DISCHARGE INSTRUCTIONS
Discharge instructions:     Call 911 or go to nearest emergency room right away if you have: PAIN or pressure in chest, OBSTRUCTED breathing or shortness of breath, SEIZURES, THOUGHTS of hurting yourself or your baby, heart palpitations/racing, change in alertness/confusion.    Pain Management:  -  Use acetaminophen (Tylenol)  as recommended on the bottle  -  Use ibuprofen (Motrin, Advil) as recommended on the bottle     Perineal Care:   -  If you have stiches, they should dissolve on their own within 6 weeks  -  Use ashkan/squirt bottle to rinse your perineal area with warm water after bowel movements and urination until vaginal bleeding ceases.  -  You may use Sitz baths for 10-15 minutes, 3-4x a day  -  Consider ice packs, as well as medicated pads and sprays for discomfort   -  It is important to stay hydrated, and to take stool softeners (docusate sodium/Colace) if needed, to keep your bowels soft while your bottom is healing. Milk of Magnesia and MiraLax (polyethylene glycol) may also be helpful.     Diet/supplementation:  -  Resume your pre-hospital diet  -  Drink plenty of water, especially if you are breastfeeding  -  Take your prenatal vitamin for as long as you are breastfeeding. Consider 5-6,000IU of vitamin D3 if you are breastfeeding to increase vitamin D levels in your breastmilk. This also may decrease incidence of “baby blues” and mood disturbance    Activity after discharge:  -  6 weeks pelvic rest. Please don’t have sex or put anything in your vaginal, including tampons or douching  -  Shower daily. If you have an incision, blow-dry on low, cool setting, or towel-dry the area  -  No lifting greater than 15lbs for 6 weeks  -  Walking and stairs as tolerated  -  Gradually increase your activity level until back to normal at approximately 6 weeks postpartum    Follow up appointment:  -  6 weeks  -  May have evaluation at 1-3 weeks for closer monitoring    Please call if:  -  You have feelings that you  want to harm yourself or others  -  You are having large-sized blood clots or soaking more than one large pad in an hour  -  You have vaginal discharge with a foul odor  -  You are having pain or burning with urination  -  Your temperature is greater than 100.4 F  -  Your pain is not controlled by the pain medication you are taking  -  You have trouble breathing at rest or when lying flat in bed  -  You have pain or tenderness in your calves  -  You are feeling weak, faint or dizzy  -  You experience worsening swelling to the feet or legs  -  You experience severe headaches and/or vision changes and/or pain in your upper abdomen   -  You experience severe chest pain  -  You have a painful, red area on your breast    Important phone numbers:  -  Answering service available 24 hours a day: [phone]  -  Midwife office: [phone]

## 2025-07-14 NOTE — CARE PLAN
The patient's goals for the shift include I will stimulate my breasts with pump if baby feeds formula    The clinical goals for the shift include Patient will breastfeed NB every 2-3 hrs or on demand      Problem: Vaginal Birth or  Section  Goal: Minimal s/sx of HDP and BP<160/110  Outcome: Progressing  Goal: No s/sx of infection through recovery  Outcome: Progressing  Goal: No s/sx of hemorrhage through recovery  Outcome: Progressing     Problem: Safety - Adult  Goal: Free from fall injury  Outcome: Progressing     Problem: Fall/Injury  Goal: Not fall by end of shift  Outcome: Progressing  Goal: Be free from injury by end of the shift  Outcome: Progressing     Problem: Discharge Planning  Goal: Discharge to home or other facility with appropriate resources  Outcome: Progressing

## 2025-07-14 NOTE — DISCHARGE SUMMARY
Discharge Summary    Admission Date: 7/11/2025  Discharge Date: 7/14/2025      Discharge Diagnosis  Multigravida in third trimester ([facility])  SVB  GDMA    Hospital Course  Delivery Date: 7/12/2025 7:56 AM  Delivery type: Vaginal, Spontaneous   GA at delivery: 39w2d  Outcome: Living  Anesthesia during delivery: Epidural  Intrapartum complications: None  Feeding method: Breastfeeding Status: No     Procedures: none  Contraception at discharge: none      Pertinent Physical Exam At Time of Discharge    General: Examination reveals a well developed, well nourished, female, in no acute distress. She is alert and cooperative.  Breasts: breasts appear normal for pregnancy, no suspicious masses, no skin or nipple changes or axillary nodes.  Fundus: firm, below umbilicus, and nontender.  Perineum: well approximated.  Psychological: awake and alert; oriented to person, place, and time.    Last Vitals:  Temp Pulse Resp BP MAP Pulse Ox   36.3 °C (97.3 °F) 58 18 105/62 100 99 %     Discharge Meds     Your medication list        START taking these medications        Instructions Last Dose Given Next Dose Due   acetaminophen 325 mg tablet  Commonly known as: Tylenol      Take 3 tablets (975 mg) by mouth every 6 hours.       docusate sodium 100 mg capsule  Commonly known as: Colace      Take 1 capsule (100 mg) by mouth 2 times a day as needed for constipation.       ibuprofen 600 mg tablet      Take 1 tablet (600 mg) by mouth every 6 hours.              CONTINUE taking these medications        Instructions Last Dose Given Next Dose Due   alcohol swabs      Use to clean finger before each stick.       blood-glucose meter misc      Please check blood sugar 4 times per day. Once in the morning before eating breakfast and 1 hour after each meal. Breakfast, lunch and dinner.       lancets misc      Please check blood sugar 4 times per day. Once in the morning before eating breakfast and 1 hour after each meal. Breakfast, lunch and  dinner. Use new needle with each stick.       levothyroxine 50 mcg tablet  Commonly known as: Synthroid, Levoxyl      Take 2 tablets (100 mcg) by mouth once daily.       Prenatal 19 29 mg iron- 1 mg tablet  Generic drug: -iron fum-folic acid                  STOP taking these medications      aspirin 81 mg EC tablet        Blood Glucose Test  Generic drug: blood sugar diagnostic        cholecalciferol 50 mcg (2,000 units) capsule  Commonly known as: Vitamin D-3                  Where to Get Your Medications        These medications were sent to [facility]  [address] , [address], [facility] [address]      Hours: 8 AM to 6 PM Mon-Fri, 9 AM to 1 PM Saturday Phone: [phone]   acetaminophen 325 mg tablet  docusate sodium 100 mg capsule  ibuprofen 600 mg tablet          Complications Requiring Follow-Up  4-6 week PP  6 week 2 hour GTT    Test Results Pending At Discharge  Pending Labs       Order Current Status    Surgical Pathology Exam - [facility] Collected (07/13/25 2003)            Outpatient Follow-Up  Future Appointments   Date Time Provider Department Center   8/21/2025 11:45 AM Prerna LAWRENCE MD [id number]       I spent >30 minutes in the professional and overall care of this patient.      Randi Bernal, M[facility]

## 2025-07-16 ENCOUNTER — APPOINTMENT (OUTPATIENT)
Dept: OBSTETRICS AND GYNECOLOGY | Facility: CLINIC | Age: 30
End: 2025-07-16
Payer: COMMERCIAL

## 2025-07-17 LAB
LABORATORY COMMENT REPORT: NORMAL
PATH REPORT.FINAL DX SPEC: NORMAL
PATH REPORT.GROSS SPEC: NORMAL
PATH REPORT.RELEVANT HX SPEC: NORMAL
PATH REPORT.TOTAL CANCER: NORMAL

## 2025-07-23 ENCOUNTER — APPOINTMENT (OUTPATIENT)
Dept: OBSTETRICS AND GYNECOLOGY | Facility: CLINIC | Age: 30
End: 2025-07-23
Payer: COMMERCIAL

## 2025-08-20 ENCOUNTER — POSTPARTUM VISIT (OUTPATIENT)
Dept: OBSTETRICS AND GYNECOLOGY | Facility: CLINIC | Age: 30
End: 2025-08-20
Payer: COMMERCIAL

## 2025-08-20 VITALS
DIASTOLIC BLOOD PRESSURE: 68 MMHG | WEIGHT: 183 LBS | HEIGHT: 65 IN | SYSTOLIC BLOOD PRESSURE: 110 MMHG | BODY MASS INDEX: 30.49 KG/M2

## 2025-08-20 DIAGNOSIS — E03.8 SUBCLINICAL HYPOTHYROIDISM: ICD-10-CM

## 2025-08-20 DIAGNOSIS — M54.50 ACUTE MIDLINE LOW BACK PAIN WITHOUT SCIATICA: Primary | ICD-10-CM

## 2025-08-20 PROBLEM — O09.819 PREGNANCY RESULTING FROM ASSISTED REPRODUCTIVE TECHNOLOGY, ANTEPARTUM (HHS-HCC): Status: RESOLVED | Noted: 2025-01-07 | Resolved: 2025-08-20

## 2025-08-20 PROBLEM — Z34.83 MULTIGRAVIDA IN THIRD TRIMESTER (HHS-HCC): Status: RESOLVED | Noted: 2025-07-11 | Resolved: 2025-08-20

## 2025-08-20 PROBLEM — D64.9 MILD ANEMIA: Status: RESOLVED | Noted: 2024-12-13 | Resolved: 2025-08-20

## 2025-08-20 PROBLEM — Z3A.39 39 WEEKS GESTATION OF PREGNANCY (HHS-HCC): Status: RESOLVED | Noted: 2025-07-12 | Resolved: 2025-08-20

## 2025-08-20 PROBLEM — Z34.90 ENCOUNTER FOR PREGNANCY RELATED EXAMINATION, ANTEPARTUM: Status: RESOLVED | Noted: 2025-01-07 | Resolved: 2025-08-20

## 2025-08-20 PROBLEM — O24.419 GESTATIONAL DIABETES MELLITUS (GDM), ANTEPARTUM (HHS-HCC): Status: RESOLVED | Noted: 2025-07-13 | Resolved: 2025-08-20

## 2025-08-20 PROCEDURE — 0503F POSTPARTUM CARE VISIT: CPT | Performed by: OBSTETRICS & GYNECOLOGY

## 2025-08-21 ENCOUNTER — APPOINTMENT (OUTPATIENT)
Facility: CLINIC | Age: 30
End: 2025-08-21
Payer: COMMERCIAL

## 2025-08-21 LAB
T4 FREE SERPL-MCNC: 1.5 NG/DL (ref 0.8–1.8)
TSH SERPL-ACNC: 0.21 MIU/L